# Patient Record
Sex: MALE | Race: WHITE | NOT HISPANIC OR LATINO | Employment: OTHER | URBAN - METROPOLITAN AREA
[De-identification: names, ages, dates, MRNs, and addresses within clinical notes are randomized per-mention and may not be internally consistent; named-entity substitution may affect disease eponyms.]

---

## 2023-12-06 ENCOUNTER — OFFICE VISIT (OUTPATIENT)
Dept: FAMILY MEDICINE | Facility: CLINIC | Age: 63
End: 2023-12-06
Payer: MEDICAID

## 2023-12-06 ENCOUNTER — HOSPITAL ENCOUNTER (EMERGENCY)
Facility: HOSPITAL | Age: 63
Discharge: HOME | End: 2023-12-06
Attending: EMERGENCY MEDICINE
Payer: MEDICAID

## 2023-12-06 VITALS
TEMPERATURE: 98 F | SYSTOLIC BLOOD PRESSURE: 196 MMHG | RESPIRATION RATE: 20 BRPM | HEIGHT: 69 IN | BODY MASS INDEX: 34.48 KG/M2 | OXYGEN SATURATION: 97 % | WEIGHT: 232.81 LBS | DIASTOLIC BLOOD PRESSURE: 123 MMHG | HEART RATE: 76 BPM

## 2023-12-06 VITALS
OXYGEN SATURATION: 96 % | HEART RATE: 67 BPM | WEIGHT: 228 LBS | BODY MASS INDEX: 33.77 KG/M2 | DIASTOLIC BLOOD PRESSURE: 86 MMHG | TEMPERATURE: 98 F | HEIGHT: 69 IN | RESPIRATION RATE: 20 BRPM | SYSTOLIC BLOOD PRESSURE: 145 MMHG

## 2023-12-06 DIAGNOSIS — I10 PRIMARY HYPERTENSION: Primary | ICD-10-CM

## 2023-12-06 DIAGNOSIS — H91.93 BILATERAL CHANGE IN HEARING: ICD-10-CM

## 2023-12-06 DIAGNOSIS — E78.5 HYPERLIPIDEMIA, UNSPECIFIED HYPERLIPIDEMIA TYPE: ICD-10-CM

## 2023-12-06 DIAGNOSIS — Z00.00 ENCOUNTER FOR WELLNESS EXAMINATION: Primary | ICD-10-CM

## 2023-12-06 DIAGNOSIS — I10 HYPERTENSION, UNSPECIFIED TYPE: ICD-10-CM

## 2023-12-06 DIAGNOSIS — Z12.5 ENCOUNTER FOR SCREENING FOR MALIGNANT NEOPLASM OF PROSTATE: ICD-10-CM

## 2023-12-06 DIAGNOSIS — H93.12 TINNITUS OF LEFT EAR: ICD-10-CM

## 2023-12-06 DIAGNOSIS — J32.4 CHRONIC PANSINUSITIS: ICD-10-CM

## 2023-12-06 LAB
ALBUMIN SERPL-MCNC: 3.8 G/DL (ref 3.4–4.8)
ALBUMIN/GLOB SERPL: 1.1 RATIO (ref 1.1–2)
ALP SERPL-CCNC: 90 UNIT/L (ref 40–150)
ALT SERPL-CCNC: 13 UNIT/L (ref 0–55)
AMPHET UR QL SCN: NEGATIVE
ANISOCYTOSIS BLD QL SMEAR: ABNORMAL
APPEARANCE UR: CLEAR
AST SERPL-CCNC: 16 UNIT/L (ref 5–34)
BARBITURATE SCN PRESENT UR: NEGATIVE
BASOPHILS # BLD AUTO: 0.08 X10(3)/MCL
BASOPHILS NFR BLD AUTO: 1 %
BENZODIAZ UR QL SCN: NEGATIVE
BILIRUB SERPL-MCNC: 0.5 MG/DL
BILIRUB UR QL STRIP.AUTO: NEGATIVE
BUN SERPL-MCNC: 6.1 MG/DL (ref 8.4–25.7)
CALCIUM SERPL-MCNC: 9.3 MG/DL (ref 8.8–10)
CANNABINOIDS UR QL SCN: NEGATIVE
CHLORIDE SERPL-SCNC: 104 MMOL/L (ref 98–107)
CHOLEST SERPL-MCNC: 212 MG/DL
CHOLEST/HDLC SERPL: 5 {RATIO} (ref 0–5)
CO2 SERPL-SCNC: 29 MMOL/L (ref 23–31)
COCAINE UR QL SCN: NEGATIVE
COLOR UR AUTO: NORMAL
CREAT SERPL-MCNC: 0.78 MG/DL (ref 0.73–1.18)
EOSINOPHIL # BLD AUTO: 0.39 X10(3)/MCL (ref 0–0.9)
EOSINOPHIL NFR BLD AUTO: 4.9 %
ERYTHROCYTE [DISTWIDTH] IN BLOOD BY AUTOMATED COUNT: 21.3 % (ref 11.5–17)
ETHANOL SERPL-MCNC: <10 MG/DL
FENTANYL UR QL SCN: NEGATIVE
GFR SERPLBLD CREATININE-BSD FMLA CKD-EPI: >60 MLS/MIN/1.73/M2
GLOBULIN SER-MCNC: 3.4 GM/DL (ref 2.4–3.5)
GLUCOSE SERPL-MCNC: 107 MG/DL (ref 82–115)
GLUCOSE UR QL STRIP.AUTO: NEGATIVE
HCT VFR BLD AUTO: 37.4 % (ref 42–52)
HDLC SERPL-MCNC: 44 MG/DL (ref 35–60)
HGB BLD-MCNC: 10.6 G/DL (ref 14–18)
HYPOCHROMIA BLD QL SMEAR: ABNORMAL
IMM GRANULOCYTES # BLD AUTO: 0.03 X10(3)/MCL (ref 0–0.04)
IMM GRANULOCYTES NFR BLD AUTO: 0.4 %
KETONES UR QL STRIP.AUTO: NEGATIVE
LDLC SERPL CALC-MCNC: 147 MG/DL (ref 50–140)
LEUKOCYTE ESTERASE UR QL STRIP.AUTO: NEGATIVE
LYMPHOCYTES # BLD AUTO: 1.46 X10(3)/MCL (ref 0.6–4.6)
LYMPHOCYTES NFR BLD AUTO: 18.2 %
MAGNESIUM SERPL-MCNC: 1.8 MG/DL (ref 1.6–2.6)
MCH RBC QN AUTO: 20.5 PG (ref 27–31)
MCHC RBC AUTO-ENTMCNC: 28.3 G/DL (ref 33–36)
MCV RBC AUTO: 72.5 FL (ref 80–94)
MDMA UR QL SCN: NEGATIVE
MICROCYTES BLD QL SMEAR: ABNORMAL
MONOCYTES # BLD AUTO: 0.63 X10(3)/MCL (ref 0.1–1.3)
MONOCYTES NFR BLD AUTO: 7.8 %
NEUTROPHILS # BLD AUTO: 5.45 X10(3)/MCL (ref 2.1–9.2)
NEUTROPHILS NFR BLD AUTO: 67.7 %
NITRITE UR QL STRIP.AUTO: NEGATIVE
NRBC BLD AUTO-RTO: 0 %
OPIATES UR QL SCN: NEGATIVE
PCP UR QL: NEGATIVE
PH UR STRIP.AUTO: 8 [PH]
PH UR: 8 [PH] (ref 3–11)
PLATELET # BLD AUTO: 418 X10(3)/MCL (ref 130–400)
PLATELET # BLD EST: ABNORMAL 10*3/UL
PMV BLD AUTO: 9.6 FL (ref 7.4–10.4)
POTASSIUM SERPL-SCNC: 4.3 MMOL/L (ref 3.5–5.1)
PROT SERPL-MCNC: 7.2 GM/DL (ref 5.8–7.6)
PROT UR QL STRIP.AUTO: NEGATIVE
RBC # BLD AUTO: 5.16 X10(6)/MCL (ref 4.7–6.1)
RBC MORPH BLD: ABNORMAL
RBC UR QL AUTO: NEGATIVE
SODIUM SERPL-SCNC: 142 MMOL/L (ref 136–145)
SP GR UR STRIP.AUTO: 1.01 (ref 1–1.03)
TRIGL SERPL-MCNC: 105 MG/DL (ref 34–140)
TROPONIN I SERPL-MCNC: 0.02 NG/ML (ref 0–0.04)
TSH SERPL-ACNC: 2.24 UIU/ML (ref 0.35–4.94)
UROBILINOGEN UR STRIP-ACNC: 0.2
VLDLC SERPL CALC-MCNC: 21 MG/DL
WBC # SPEC AUTO: 8.04 X10(3)/MCL (ref 4.5–11.5)

## 2023-12-06 PROCEDURE — 83735 ASSAY OF MAGNESIUM: CPT | Performed by: EMERGENCY MEDICINE

## 2023-12-06 PROCEDURE — 99285 EMERGENCY DEPT VISIT HI MDM: CPT | Mod: 25,27

## 2023-12-06 PROCEDURE — 84443 ASSAY THYROID STIM HORMONE: CPT | Performed by: EMERGENCY MEDICINE

## 2023-12-06 PROCEDURE — 3008F PR BODY MASS INDEX (BMI) DOCUMENTED: ICD-10-PCS | Mod: CPTII,,,

## 2023-12-06 PROCEDURE — 3077F PR MOST RECENT SYSTOLIC BLOOD PRESSURE >= 140 MM HG: ICD-10-PCS | Mod: CPTII,,,

## 2023-12-06 PROCEDURE — 96374 THER/PROPH/DIAG INJ IV PUSH: CPT

## 2023-12-06 PROCEDURE — 3080F DIAST BP >= 90 MM HG: CPT | Mod: CPTII,,,

## 2023-12-06 PROCEDURE — 99204 OFFICE O/P NEW MOD 45 MIN: CPT | Mod: PBBFAC,PN,25

## 2023-12-06 PROCEDURE — 99386 PR PREVENTIVE VISIT,NEW,40-64: ICD-10-PCS | Mod: S$PBB,,,

## 2023-12-06 PROCEDURE — 93010 ELECTROCARDIOGRAM REPORT: CPT | Mod: ,,, | Performed by: STUDENT IN AN ORGANIZED HEALTH CARE EDUCATION/TRAINING PROGRAM

## 2023-12-06 PROCEDURE — 82077 ASSAY SPEC XCP UR&BREATH IA: CPT | Performed by: EMERGENCY MEDICINE

## 2023-12-06 PROCEDURE — 84484 ASSAY OF TROPONIN QUANT: CPT | Performed by: EMERGENCY MEDICINE

## 2023-12-06 PROCEDURE — 1160F RVW MEDS BY RX/DR IN RCRD: CPT | Mod: CPTII,,,

## 2023-12-06 PROCEDURE — 93010 EKG 12-LEAD: ICD-10-PCS | Mod: ,,, | Performed by: STUDENT IN AN ORGANIZED HEALTH CARE EDUCATION/TRAINING PROGRAM

## 2023-12-06 PROCEDURE — 80061 LIPID PANEL: CPT | Performed by: EMERGENCY MEDICINE

## 2023-12-06 PROCEDURE — 85025 COMPLETE CBC W/AUTO DIFF WBC: CPT | Performed by: EMERGENCY MEDICINE

## 2023-12-06 PROCEDURE — 1159F PR MEDICATION LIST DOCUMENTED IN MEDICAL RECORD: ICD-10-PCS | Mod: CPTII,,,

## 2023-12-06 PROCEDURE — 99386 PREV VISIT NEW AGE 40-64: CPT | Mod: S$PBB,,,

## 2023-12-06 PROCEDURE — 93005 ELECTROCARDIOGRAM TRACING: CPT

## 2023-12-06 PROCEDURE — 25000003 PHARM REV CODE 250: Performed by: EMERGENCY MEDICINE

## 2023-12-06 PROCEDURE — 3008F BODY MASS INDEX DOCD: CPT | Mod: CPTII,,,

## 2023-12-06 PROCEDURE — 80053 COMPREHEN METABOLIC PANEL: CPT | Performed by: EMERGENCY MEDICINE

## 2023-12-06 PROCEDURE — 80307 DRUG TEST PRSMV CHEM ANLYZR: CPT | Performed by: EMERGENCY MEDICINE

## 2023-12-06 PROCEDURE — 63600175 PHARM REV CODE 636 W HCPCS: Performed by: EMERGENCY MEDICINE

## 2023-12-06 PROCEDURE — 81003 URINALYSIS AUTO W/O SCOPE: CPT | Mod: 59 | Performed by: EMERGENCY MEDICINE

## 2023-12-06 PROCEDURE — 3080F PR MOST RECENT DIASTOLIC BLOOD PRESSURE >= 90 MM HG: ICD-10-PCS | Mod: CPTII,,,

## 2023-12-06 PROCEDURE — 3077F SYST BP >= 140 MM HG: CPT | Mod: CPTII,,,

## 2023-12-06 PROCEDURE — 1160F PR REVIEW ALL MEDS BY PRESCRIBER/CLIN PHARMACIST DOCUMENTED: ICD-10-PCS | Mod: CPTII,,,

## 2023-12-06 PROCEDURE — 1159F MED LIST DOCD IN RCRD: CPT | Mod: CPTII,,,

## 2023-12-06 RX ORDER — AMLODIPINE BESYLATE 10 MG/1
10 TABLET ORAL DAILY
Qty: 30 TABLET | Refills: 2 | Status: SHIPPED | OUTPATIENT
Start: 2023-12-06 | End: 2023-12-13

## 2023-12-06 RX ORDER — DOXYCYCLINE 100 MG/1
100 CAPSULE ORAL 2 TIMES DAILY
Qty: 20 CAPSULE | Refills: 0 | Status: SHIPPED | OUTPATIENT
Start: 2023-12-06 | End: 2023-12-16

## 2023-12-06 RX ORDER — AMLODIPINE BESYLATE 5 MG/1
10 TABLET ORAL
Status: COMPLETED | OUTPATIENT
Start: 2023-12-06 | End: 2023-12-06

## 2023-12-06 RX ORDER — HYDRALAZINE HYDROCHLORIDE 20 MG/ML
10 INJECTION INTRAMUSCULAR; INTRAVENOUS
Status: COMPLETED | OUTPATIENT
Start: 2023-12-06 | End: 2023-12-06

## 2023-12-06 RX ORDER — CLONIDINE HYDROCHLORIDE 0.1 MG/1
0.2 TABLET ORAL
Status: COMPLETED | OUTPATIENT
Start: 2023-12-06 | End: 2023-12-06

## 2023-12-06 RX ORDER — BENAZEPRIL HYDROCHLORIDE 40 MG/1
40 TABLET ORAL DAILY
Qty: 30 TABLET | Refills: 2 | Status: SHIPPED | OUTPATIENT
Start: 2023-12-06 | End: 2023-12-13 | Stop reason: SDUPTHER

## 2023-12-06 RX ORDER — AZELASTINE HYDROCHLORIDE, FLUTICASONE PROPIONATE 137; 50 UG/1; UG/1
1 SPRAY, METERED NASAL 2 TIMES DAILY
Qty: 1 EACH | Refills: 1 | Status: SHIPPED | OUTPATIENT
Start: 2023-12-06 | End: 2024-01-16

## 2023-12-06 RX ADMIN — AMLODIPINE BESYLATE 10 MG: 5 TABLET ORAL at 11:12

## 2023-12-06 RX ADMIN — CLONIDINE HYDROCHLORIDE 0.2 MG: 0.1 TABLET ORAL at 09:12

## 2023-12-06 RX ADMIN — HYDRALAZINE HYDROCHLORIDE 10 MG: 20 INJECTION INTRAMUSCULAR; INTRAVENOUS at 09:12

## 2023-12-06 NOTE — ASSESSMENT & PLAN NOTE
Patient directed to report to ED for control of HTN and return next week once stabilized. Discussed unable to monitor in primary care clinic, patient amenable with this plan. Transportation offered and refused.

## 2023-12-06 NOTE — ED TRIAGE NOTES
Hypertension Pt to er from [facility] urgent care with c/o high blood pressure readings. Denies s/s.

## 2023-12-06 NOTE — ED PROVIDER NOTES
Encounter Date: 12/6/2023       History     Chief Complaint   Patient presents with    Hypertension     Pt to er from [facility] urgent care with c/o high blood pressure readings. Denies s/s.     63-year-old male with a history of hypertension, hyperlipidemia, chronic sinusitis, presents to the emergency room due to severely elevated blood pressure.  He only complains of severe sinus congestion but has no fever, headache, chest pain, shortness of breath, or other symptoms.  He went to his PCP this morning but was referred to the emergency room due to his severely elevated blood pressure. /148.  He was on amlodipine 10 mg and benazepril 40 mg daily but stopped this on his own 3 to 4 years ago.  He also was on cholesterol medicine which he discontinued.  The sinus congestion has been present for 4-6 months.  He had sinus surgery and also throat surgery due to snoring many years ago.  He does have a history of alcoholism but quit drinking 2 weeks ago.        Review of patient's allergies indicates:   Allergen Reactions    Penicillins Other (See Comments)     No past medical history on file.  No past surgical history on file.  No family history on file.  Social History     Tobacco Use    Smoking status: Never    Smokeless tobacco: Never   Substance Use Topics    Alcohol use: Yes     Comment: occ    Drug use: Never     Review of Systems   HENT:  Positive for rhinorrhea, sinus pressure and sinus pain.    All other systems reviewed and are negative.      Physical Exam     Initial Vitals [12/06/23 0908]   BP Pulse Resp Temp SpO2   (!) 222/148 76 20 97.9 °F (36.6 °C) 98 %      MAP       --         Physical Exam    Nursing note and vitals reviewed.  Constitutional: Vital signs are normal. He appears well-developed and well-nourished.   HENT:   Head: Normocephalic and atraumatic.   Mouth/Throat: Oropharynx is clear and moist.   TMs are normal, no mastoid erythema or tenderness   Eyes: Pupils are equal, round, and reactive to  light.   Neck: Neck supple. No JVD present.   Cardiovascular:  Normal rate, regular rhythm and normal heart sounds.           Pulmonary/Chest: Breath sounds normal. No respiratory distress.   Abdominal: Abdomen is soft. There is no abdominal tenderness.   Musculoskeletal:         General: No edema.      Cervical back: Neck supple. No edema or erythema.     Lymphadenopathy:     He has no cervical adenopathy.   Neurological: He is alert and oriented to person, place, and time. No cranial nerve deficit. GCS score is 15. GCS eye subscore is 4. GCS verbal subscore is 5. GCS motor subscore is 6.   Skin: Skin is warm and dry. Capillary refill takes less than 2 seconds.         ED Course   Procedures  Labs Reviewed   CBC WITH DIFFERENTIAL - Abnormal; Notable for the following components:       Result Value    Hgb 10.6 (*)     Hct 37.4 (*)     MCV 72.5 (*)     MCH 20.5 (*)     MCHC 28.3 (*)     RDW 21.3 (*)     Platelet 418 (*)     All other components within normal limits   COMPREHENSIVE METABOLIC PANEL - Abnormal; Notable for the following components:    Blood Urea Nitrogen 6.1 (*)     All other components within normal limits   LIPID PANEL - Abnormal; Notable for the following components:    Cholesterol Total 212 (*)     LDL Cholesterol 147.00 (*)     All other components within normal limits   BLOOD SMEAR MICROSCOPIC EXAM (OLG) - Abnormal; Notable for the following components:    RBC Morph Abnormal (*)     Anisocytosis 2+ (*)     Hypochromasia 3+ (*)     Microcytosis 2+ (*)     Platelets Increased (*)     All other components within normal limits   URINALYSIS, REFLEX TO URINE CULTURE - Normal   TROPONIN I - Normal   ALCOHOL,MEDICAL (ETHANOL) - Normal   DRUG SCREEN, URINE (BEAKER) - Normal    Narrative:     Cut off concentrations:    Amphetamines - 1000 ng/ml  Barbiturates - 200 ng/ml  Benzodiazepine - 200 ng/ml  Cannabinoids (THC) - 50 ng/ml  Cocaine - 300 ng/ml  Fentanyl - 1.0 ng/ml  MDMA - 500 ng/ml  Opiates - 300  ng/ml   Phencyclidine (PCP) - 25 ng/ml    Specimen submitted for drug analysis and tested for pH and specific gravity in order to evaluate sample integrity. Suspect tampering if specific gravity is <1.003 and/or pH is not within the range of 4.5 - 8.0  False negatives may result form substances such as bleach added to urine.  False positives may result for the presence of a substance with similar chemical structure to the drug or its metabolite.    This test provides only a PRELIMINARY analytical test result. A more specific alternate chemical method must be used in order to obtain a confirmed analytical result. Gas chromatography/mass spectrometry (GC/MS) is the preferred confirmatory method. Other chemical confirmation methods are available. Clinical consideration and professional judgement should be applied to any drug of abuse test result, particularly when preliminary positive results are used.    Positive results will be confirmed only at the physicians request. Unconfirmed screening results are to be used only for medical purposes (treatment).        TSH - Normal   MAGNESIUM - Normal     EKG Readings: (Independently Interpreted)   Initial Reading: No STEMI. Rhythm: Normal Sinus Rhythm. Heart Rate: 72. Ectopy: PACs. ST Segments: Normal ST Segments. T Waves: Normal. Clinical Impression: Normal Sinus Rhythm       Imaging Results              CT Sinuses without Contrast (Final result)  Result time 12/06/23 11:32:53      Final result by Sid Gibson MD (12/06/23 11:32:53)                   Impression:      1. Severe sinusitis changes.    2. Left mastoiditis changes.      Electronically signed by: Sid Gibson  Date:    12/06/2023  Time:    11:32               Narrative:    EXAMINATION:  CT SINUSES WITHOUT CONTRAST    CLINICAL HISTORY:  Sinusitis, chronic or recurrent;    TECHNIQUE:  Sequential images were performed of the paranasal sinuses without contrast administration.    Dose length product was 1007 mGycm.  Automated exposure control was utilized to minimize radiation dose.    COMPARISON:  None available    FINDINGS:  There is complete loss of pneumatization of the left maxillary sinus occupied by mucoperiosteal thickening which contiguously extends across the expanded ostiomeatal unit.  There is also complete loss of pneumatization of the left ethmoidal air cell and the left frontal sinus again occupied by mucoperiosteal thickening.  Associated sinonasal polyposis is without exclusion.   There is also pronounced mucoperiosteal thickening of the small volume right frontal and the right ethmoid air cells.  Moderate mucoperiosteal thickening also involveSthe right maxillary sinus with obliteration of the ostiomeatal unit.  There are also mild chronic sinusitis changes involving the sphenoid sinus.    There are no orbital subperiosteal inflammations.    Loss of pneumatization of the left mastoid air cells with opacification and fluid.                                       CT Head Without Contrast (Final result)  Result time 12/06/23 11:36:38      Final result by Sid Gibson MD (12/06/23 11:36:38)                   Impression:      No acute intracranial findings identified.      Electronically signed by: Sid Gibson  Date:    12/06/2023  Time:    11:36               Narrative:    EXAMINATION:  CT HEAD WITHOUT CONTRAST    CLINICAL HISTORY:  Headache, chronic, new features or increased frequency;    TECHNIQUE:  Sequential axial images were performed of the brain without contrast.    Dose product length of 1007 mGycm. Automated exposure control was utilized to minimize radiation dose.    COMPARISON:  None available.    FINDINGS:  There is no intracranial mass effect, midline shift, hydrocephalus or hemorrhage. There is no sulcal effacement or low attenuation changes to suggest recent large vessel territory infarction. Chronic appearing periventricular and subcortical white matter low attenuation changes are present and  are consistent with mild chronic microangiopathic ischemia. The ventricular system and sulcal markings prominence is consistent with atrophy. There is no acute extra axial fluid collection.    Sinuses findings are described on separate report.                                       Medications   cloNIDine tablet 0.2 mg (0.2 mg Oral Given 12/6/23 0953)   hydrALAZINE injection 10 mg (10 mg Intravenous Given 12/6/23 0953)   amLODIPine tablet 10 mg (10 mg Oral Given 12/6/23 1144)     Medical Decision Making  63-year-old male with a history of hypertension, hyperlipidemia, chronic sinusitis, presents to the emergency room due to severely elevated blood pressure.  He only complains of severe sinus congestion but has no fever, headache, chest pain, shortness of breath, or other symptoms.  He went to his PCP this morning but was referred to the emergency room due to his severely elevated blood pressure. /148.  He was on amlodipine 10 mg and benazepril 40 mg daily but stopped this on his own 3 to 4 years ago.  He also was on cholesterol medicine which he discontinued.  The sinus congestion has been present for 4-6 months.  He had sinus surgery and also throat surgery due to snoring many years ago.  He does have a history of alcoholism but quit drinking 2 weeks ago.      Differential diagnosis includes but is not limited to hypertensive urgency, hypertensive emergency, alcoholism, sinusitis, mastoiditis, otitis media    Amount and/or Complexity of Data Reviewed  Labs: ordered.  Radiology: ordered.  Discussion of management or test interpretation with external provider(s): Patient was seen and evaluated in the emergency department and treated for his hypertension in his blood pressure responded well.  He remained asymptomatic other than his sinus congestion.  CT scan is very concerning for his pan sinusitis and I will refer him to ENT at [facility] considering he is had sinus surgery in the past.  I will treat him with  antibiotics and start him his blood pressure medication.  Should be seeing his PCP next week so I will defer to the PCP regarding treatment of the hyperlipidemia.  Patient has had no headache or chest pain and no shortness of breath.  He is stable for discharge home with ER return precautions discussed.    Risk  Prescription drug management.                                      Clinical Impression:  Final diagnoses:  [I10] Primary hypertension (Primary)  [J32.4] Chronic pansinusitis  [E78.5] Hyperlipidemia, unspecified hyperlipidemia type          ED Disposition Condition    Discharge Stable          ED Prescriptions       Medication Sig Dispense Start Date End Date Auth. Provider    amLODIPine (NORVASC) 10 MG tablet Take 1 tablet (10 mg total) by mouth once daily. 30 tablet 12/6/2023 12/5/2024 Adrianna Diego MD    benazepriL (LOTENSIN) 40 MG tablet Take 1 tablet (40 mg total) by mouth once daily. 30 tablet 12/6/2023 -- Adrianna Diego MD    azelastine-fluticasone (DYMISTA) 137-50 mcg/spray Spry nassal spray 1 spray by Each Nostril route 2 (two) times daily. 1 each 12/6/2023 -- Adrianna Diego MD    doxycycline (VIBRAMYCIN) 100 MG Cap Take 1 capsule (100 mg total) by mouth 2 (two) times daily. for 10 days 20 capsule 12/6/2023 12/16/2023 Adrianna Diego MD          Follow-up Information       Follow up With Specialties Details Why Contact Info    Rayna Preston NP Family Medicine   [address] [place] [id number]  [phone]               Adrianna Diego MD  12/08/23 0612

## 2023-12-06 NOTE — PROGRESS NOTES
"Patient Name: Rodriguez Dwyer     : 1960    MRN: [id number]     Subjective:     Patient ID: Rodriguez Dwyer is a 63 y.o. male.    Chief Complaint:   Chief Complaint   Patient presents with    Establish Care     New patient. C/o sinus issues. States went to ED, xrays were taken.         HPI: 2023: Patient presents to clinic today to establish care, patient has no chronic medical conditions which they have ever been managed.  Patient does not take any daily medications that they need refilled. Patient denies chest pain, palpitations, and shortness of breath. He is extremely hypertensive in clinic today and states he went to ED about a month ago for head pressure but was told his ears were "infected". Ears clear today.  Endorses tinnitus in his left ear as well as bilateral hearing loss.  Thought this was all because of an infection in his ears.  Endorses "constant head pressure" since this ED visit.  Patient denies fever, night sweats, chills, nausea, vomiting, diarrhea, constipation, weight loss, and changes in appetite.  Wellness labs (CBC, CMP, A1c, FLP, TSH, Free T4) ordered for patient today, with added PSA for prostate cancer screening screening.        ROS:       12 point review of systems conducted, negative except as stated in the history of present illness. See HPI for details.    History:     History reviewed. No pertinent past medical history.     History reviewed. No pertinent surgical history.    History reviewed. No pertinent family history.     Social History     Tobacco Use    Smoking status: Never    Smokeless tobacco: Never   Substance and Sexual Activity    Alcohol use: Yes     Comment: occ    Drug use: Never    Sexual activity: Not on file       No current outpatient medications     Review of patient's allergies indicates:   Allergen Reactions    Penicillins Other (See Comments)       Objective:     Visit Vitals  BP (!) 196/123 (BP Location: Left arm, Patient Position: Sitting)   Pulse 76 " "  Temp 98 °F (36.7 °C) (Oral)   Resp 20   Ht 5' 9" (1.753 m)   Wt 105.6 kg (232 lb 12.8 oz)   SpO2 97%   BMI 34.38 kg/m²       Physical Examination:     Physical Exam  Constitutional:       General: He is not in acute distress.     Appearance: Normal appearance. He is not ill-appearing.   Cardiovascular:      Rate and Rhythm: Normal rate and regular rhythm.      Heart sounds: Normal heart sounds.   Pulmonary:      Effort: Pulmonary effort is normal. No respiratory distress.      Breath sounds: Normal breath sounds.   Musculoskeletal:      Cervical back: Normal range of motion.   Skin:     General: Skin is warm and dry.   Neurological:      Mental Status: He is alert and oriented to person, place, and time.   Psychiatric:         Mood and Affect: Mood normal.         Behavior: Behavior normal.         Lab Results:     Chemistry:  No results found for: "NA", "K", "CHLORIDE", "BUN", "CREATININE", "EGFRNORACEVR", "GLUCOSE", "CALCIUM", "ALKPHOS", "LABPROT", "ALBUMIN", "BILIDIR", "IBILI", "AST", "ALT", "MG", "PHOS", "[id number]", "TSH", "[id number]", "PSA"     No results found for: "HGBA1C", "MICROALBCREA"     Hematology:  No results found for: "WBC", "HGB", "HCT", "PLT"    Lipid Panel:  No results found for: "CHOL", "HDL", "LDL", "TRIG", "TOTALCHOLEST"     Urine:  No results found for: "COLORUA", "APPEARANCEUA", "SGUA", "PHUA", "PROTEINUA", "GLUCOSEUA", "KETONESUA", "BLOODUA", "NITRITESUA", "LEUKOCYTESUR", "RBCUA", "WBCUA", "BACTERIA", "SQEPUA", "HYALINECASTS", "CREATRANDUR", "PROTEINURINE", "UPROTCREA"     Assessment:          ICD-10-CM ICD-9-CM   1. Encounter for wellness examination  Z00.00 V70.0   2. Encounter for screening for malignant neoplasm of prostate  Z12.5 V76.44   3. Hypertension, unspecified type  I10 401.9   4. Tinnitus of left ear  H93.12 388.30   5. Bilateral change in hearing  H91.93 389.9        Plan:     1. Encounter for wellness examination  -     Cancel: TSH  -     Cancel: T4, Free  -     " Cancel: Hemoglobin A1C  -     Cancel: SYPHILIS ANTIBODY (WITH REFLEX RPR)  -     Cancel: Hepatitis Panel, Acute  -     Cancel: Lipid Panel  -     Cancel: CBC Auto Differential  -     Cancel: Comprehensive Metabolic Panel  -     Cancel: HIV 1/2 Ag/Ab (4th Gen)  -     Cancel: Vitamin D  -     Chlamydia/GC, PCR  -     Cancel: Urinalysis, Reflex to Urine Culture  -     TSH; Future; Expected date: 12/06/2023  -     T4, Free; Future; Expected date: 12/06/2023  -     Hemoglobin A1C; Future; Expected date: 12/06/2023  -     SYPHILIS ANTIBODY (WITH REFLEX RPR); Future; Expected date: 12/06/2023  -     Hepatitis Panel, Acute; Future; Expected date: 12/06/2023  -     Lipid Panel; Future; Expected date: 12/06/2023  -     CBC Auto Differential; Future; Expected date: 12/06/2023  -     Comprehensive Metabolic Panel; Future; Expected date: 12/06/2023  -     HIV 1/2 Ag/Ab (4th Gen); Future; Expected date: 12/06/2023  -     Vitamin D; Future; Expected date: 12/06/2023  -     Urinalysis, Reflex to Urine Culture; Future; Expected date: 12/06/2023    2. Encounter for screening for malignant neoplasm of prostate  -     Cancel: PSA, Screening  -     PSA, Screening; Future; Expected date: 12/06/2023    3. Hypertension, unspecified type  Overview:  Low Sodium Diet (Dash Diet - less than 2 grams of sodium per day).  Monitor Blood Pressure daily and log. Report any consistent numbers greater than 140/90.  Smoking Cessation encouraged to aid in BP reduction.  Maintain healthy weight with goal BMI <30. Exercise 30 minutes per day 5 days per week        Assessment & Plan:  Patient directed to report to ED for control of HTN and return next week once stabilized. Discussed unable to monitor in primary care clinic, patient amenable with this plan. Transportation offered and refused.          4. Tinnitus of left ear  -     Ambulatory referral/consult to Audiology; Future; Expected date: 12/06/2023    5. Bilateral change in hearing  -     Ambulatory  referral/consult to Audiology; Future; Expected date: 12/06/2023       Patient may come to clinic early next week to complete labs prior to his appointment on 12/13 should he want these labs available for review at this appointment.    Future Appointments   Date Time Provider Department Center   12/13/2023  7:00 AM Rayna Preston NP [facility]      RED FLAGS/WARNING SYMPTOMS DISCUSSED WITH PATIENT THAT WOULD WARRANT EMERGENT MEDICAL ATTENTION. PATIENT VERBALIZED UNDERSTANDING.     Rayna Preston NP

## 2023-12-06 NOTE — DISCHARGE INSTRUCTIONS
Take your medication as prescribed.  Referral to ear nose and throat at [facility] was placed.  Follow-up with your doctor on Wednesday as scheduled. You will also need a cholesterol medicine but I will not start that today.  Return to the emergency room for any severe symptoms like severe headache, chest pain, passing out, or other symptoms.

## 2023-12-13 ENCOUNTER — OFFICE VISIT (OUTPATIENT)
Dept: FAMILY MEDICINE | Facility: CLINIC | Age: 63
End: 2023-12-13
Payer: MEDICAID

## 2023-12-13 ENCOUNTER — HOSPITAL ENCOUNTER (OUTPATIENT)
Dept: RADIOLOGY | Facility: HOSPITAL | Age: 63
Discharge: HOME | End: 2023-12-13
Payer: MEDICAID

## 2023-12-13 VITALS
DIASTOLIC BLOOD PRESSURE: 72 MMHG | BODY MASS INDEX: 33.45 KG/M2 | TEMPERATURE: 98 F | RESPIRATION RATE: 20 BRPM | HEIGHT: 69 IN | HEART RATE: 79 BPM | SYSTOLIC BLOOD PRESSURE: 145 MMHG | OXYGEN SATURATION: 97 % | WEIGHT: 225.81 LBS

## 2023-12-13 DIAGNOSIS — Z00.00 ENCOUNTER FOR WELLNESS EXAMINATION: ICD-10-CM

## 2023-12-13 DIAGNOSIS — Z12.5 ENCOUNTER FOR SCREENING FOR MALIGNANT NEOPLASM OF PROSTATE: ICD-10-CM

## 2023-12-13 DIAGNOSIS — M25.532 LEFT WRIST PAIN: ICD-10-CM

## 2023-12-13 DIAGNOSIS — E78.49 OTHER HYPERLIPIDEMIA: ICD-10-CM

## 2023-12-13 DIAGNOSIS — I10 HYPERTENSION, UNSPECIFIED TYPE: Primary | ICD-10-CM

## 2023-12-13 DIAGNOSIS — R09.81 SINUS CONGESTION: ICD-10-CM

## 2023-12-13 LAB
BASOPHILS # BLD AUTO: 0.12 X10(3)/MCL
BASOPHILS NFR BLD AUTO: 1.3 %
EOSINOPHIL # BLD AUTO: 0.46 X10(3)/MCL (ref 0–0.9)
EOSINOPHIL NFR BLD AUTO: 5.1 %
ERYTHROCYTE [DISTWIDTH] IN BLOOD BY AUTOMATED COUNT: 21.5 % (ref 11.5–17)
EST. AVERAGE GLUCOSE BLD GHB EST-MCNC: 114 MG/DL
HAV IGM SERPL QL IA: NONREACTIVE
HBA1C MFR BLD: 5.6 %
HBV CORE IGM SERPL QL IA: NONREACTIVE
HBV SURFACE AG SERPL QL IA: NONREACTIVE
HCT VFR BLD AUTO: 39.3 % (ref 42–52)
HCV AB SERPL QL IA: NONREACTIVE
HGB BLD-MCNC: 10.8 G/DL (ref 14–18)
HIV 1+2 AB+HIV1 P24 AG SERPL QL IA: NONREACTIVE
HYPOCHROMIA BLD QL SMEAR: ABNORMAL
IMM GRANULOCYTES # BLD AUTO: 0.02 X10(3)/MCL (ref 0–0.04)
IMM GRANULOCYTES NFR BLD AUTO: 0.2 %
LYMPHOCYTES # BLD AUTO: 1.72 X10(3)/MCL (ref 0.6–4.6)
LYMPHOCYTES NFR BLD AUTO: 19 %
MCH RBC QN AUTO: 20.4 PG (ref 27–31)
MCHC RBC AUTO-ENTMCNC: 27.5 G/DL (ref 33–36)
MCV RBC AUTO: 74.2 FL (ref 80–94)
MICROCYTES BLD QL SMEAR: ABNORMAL
MONOCYTES # BLD AUTO: 0.7 X10(3)/MCL (ref 0.1–1.3)
MONOCYTES NFR BLD AUTO: 7.7 %
NEUTROPHILS # BLD AUTO: 6.02 X10(3)/MCL (ref 2.1–9.2)
NEUTROPHILS NFR BLD AUTO: 66.7 %
NRBC BLD AUTO-RTO: 0 %
PLATELET # BLD AUTO: 435 X10(3)/MCL (ref 130–400)
PLATELET # BLD EST: ABNORMAL 10*3/UL
PMV BLD AUTO: 9.6 FL (ref 7.4–10.4)
PSA SERPL-MCNC: 2.74 NG/ML
RBC # BLD AUTO: 5.3 X10(6)/MCL (ref 4.7–6.1)
RBC MORPH BLD: ABNORMAL
SPHEROCYTES BLD QL SMEAR: ABNORMAL
T PALLIDUM AB SER QL: NONREACTIVE
WBC # SPEC AUTO: 9.04 X10(3)/MCL (ref 4.5–11.5)

## 2023-12-13 PROCEDURE — 85025 COMPLETE CBC W/AUTO DIFF WBC: CPT

## 2023-12-13 PROCEDURE — 99214 OFFICE O/P EST MOD 30 MIN: CPT | Mod: S$PBB,,,

## 2023-12-13 PROCEDURE — 1159F MED LIST DOCD IN RCRD: CPT | Mod: CPTII,,,

## 2023-12-13 PROCEDURE — 4010F ACE/ARB THERAPY RXD/TAKEN: CPT | Mod: CPTII,,,

## 2023-12-13 PROCEDURE — 3008F BODY MASS INDEX DOCD: CPT | Mod: CPTII,,,

## 2023-12-13 PROCEDURE — 99215 OFFICE O/P EST HI 40 MIN: CPT | Mod: PBBFAC,PN

## 2023-12-13 PROCEDURE — 4010F PR ACE/ARB THEARPY RXD/TAKEN: ICD-10-PCS | Mod: CPTII,,,

## 2023-12-13 PROCEDURE — 3078F DIAST BP <80 MM HG: CPT | Mod: CPTII,,,

## 2023-12-13 PROCEDURE — 80074 ACUTE HEPATITIS PANEL: CPT

## 2023-12-13 PROCEDURE — 1159F PR MEDICATION LIST DOCUMENTED IN MEDICAL RECORD: ICD-10-PCS | Mod: CPTII,,,

## 2023-12-13 PROCEDURE — 3077F SYST BP >= 140 MM HG: CPT | Mod: CPTII,,,

## 2023-12-13 PROCEDURE — 84153 ASSAY OF PSA TOTAL: CPT

## 2023-12-13 PROCEDURE — 83036 HEMOGLOBIN GLYCOSYLATED A1C: CPT

## 2023-12-13 PROCEDURE — 1160F PR REVIEW ALL MEDS BY PRESCRIBER/CLIN PHARMACIST DOCUMENTED: ICD-10-PCS | Mod: CPTII,,,

## 2023-12-13 PROCEDURE — 73110 X-RAY EXAM OF WRIST: CPT | Mod: TC,PN,LT

## 2023-12-13 PROCEDURE — 3008F PR BODY MASS INDEX (BMI) DOCUMENTED: ICD-10-PCS | Mod: CPTII,,,

## 2023-12-13 PROCEDURE — 36415 COLL VENOUS BLD VENIPUNCTURE: CPT

## 2023-12-13 PROCEDURE — 99214 PR OFFICE/OUTPT VISIT, EST, LEVL IV, 30-39 MIN: ICD-10-PCS | Mod: S$PBB,,,

## 2023-12-13 PROCEDURE — 1160F RVW MEDS BY RX/DR IN RCRD: CPT | Mod: CPTII,,,

## 2023-12-13 PROCEDURE — 3078F PR MOST RECENT DIASTOLIC BLOOD PRESSURE < 80 MM HG: ICD-10-PCS | Mod: CPTII,,,

## 2023-12-13 PROCEDURE — 3077F PR MOST RECENT SYSTOLIC BLOOD PRESSURE >= 140 MM HG: ICD-10-PCS | Mod: CPTII,,,

## 2023-12-13 PROCEDURE — 86780 TREPONEMA PALLIDUM: CPT

## 2023-12-13 PROCEDURE — 87389 HIV-1 AG W/HIV-1&-2 AB AG IA: CPT

## 2023-12-13 RX ORDER — PREDNISONE 10 MG/1
TABLET ORAL
Qty: 10 TABLET | Refills: 0 | Status: SHIPPED | OUTPATIENT
Start: 2023-12-13 | End: 2023-12-20

## 2023-12-13 RX ORDER — CETIRIZINE HYDROCHLORIDE 10 MG/1
10 TABLET ORAL EVERY MORNING
COMMUNITY
Start: 2023-10-24

## 2023-12-13 RX ORDER — BENAZEPRIL HYDROCHLORIDE 40 MG/1
40 TABLET ORAL DAILY
Qty: 90 TABLET | Refills: 3 | Status: SHIPPED | OUTPATIENT
Start: 2023-12-13 | End: 2024-12-12

## 2023-12-13 RX ORDER — FLUTICASONE PROPIONATE 50 MCG
SPRAY, SUSPENSION (ML) NASAL
COMMUNITY
Start: 2023-10-24 | End: 2024-01-16 | Stop reason: SDUPTHER

## 2023-12-13 RX ORDER — NIFEDIPINE 60 MG/1
60 TABLET, EXTENDED RELEASE ORAL DAILY
Qty: 90 TABLET | Refills: 3 | Status: SHIPPED | OUTPATIENT
Start: 2023-12-13 | End: 2024-12-12

## 2023-12-13 NOTE — ASSESSMENT & PLAN NOTE
Went to ED after previous office visit and received IV medications and PRN clonidine. Was started on amlodipine and benazepril 40 mg.  Still hypertensive in clinic today, does not want to take more than 2 blood pressure medications so we will stopping amlodipine and start nifedipine 60 mg.

## 2023-12-13 NOTE — ASSESSMENT & PLAN NOTE
CT of sinus on 12/6/23 showed Loss of pneumatization of the left mastoid air cells with opacification and fluid.  He has appointment on 1/16 with ENT.   Patient was given the phone number to all providers that they were referred to, encouraged patient to reach out to these providers directly should they need to change appointment. Patient verbalized understanding of these directions.

## 2023-12-13 NOTE — PROGRESS NOTES
Patient Name: Rodriguez Dwyer     : 1960    MRN: [id number]     Subjective:     Patient ID: Rodriguez Dwyer is a 63 y.o. male.    Chief Complaint:   Chief Complaint   Patient presents with    Follow-up     One week f/u. States feels tired from new medications.        HPI: 2023: Patient presents to clinic today to review labs. Discussed labs at length with patient and all questions were answered to patients understanding. Patient has new complaint today of left wrist pain, states that he worked at [employer] for about 10 years in the repetitive motion caused inflammation and pain to his left wrist.  States that at times he feels that it is weaker than his right hand, fear condition not present on exam today.  Requesting referral to orthopedic surgeon that his daughter works at.  Additionally not all blood work was collected during ED visit, we will collect the rest of his wellness blood work.  He is hypertensive in clinic today but denies blurred vision, chest pain, shortness and breath or edema.  States that he does have occasional headaches still but it has improved.  Has been feeling fatigue since starting blood pressure medication.  He also has an upcoming appointment in January with ENT with audiology appointment after.  Patient was prescribed antibiotics in the emergency department as well as a nasal spray which he is no longer taking.  He did complete his antibiotics. Patient denies chest pain, palpitations, and shortness of breath.  Patient denies fever, night sweats, chills, nausea, vomiting, diarrhea, constipation, weight loss, and changes in appetite.      2023: Patient presents to clinic today to establish care, patient has no chronic medical conditions which they have ever been managed.  Patient does not take any daily medications that they need refilled. Patient denies chest pain, palpitations, and shortness of breath. He is extremely hypertensive in clinic today and states he went to ED about a  "month ago for head pressure but was told his ears were "infected". Ears clear today.  Endorses tinnitus in his left ear as well as bilateral hearing loss.  Thought this was all because of an infection in his ears.  Endorses "constant head pressure" since this ED visit.  Patient denies fever, night sweats, chills, nausea, vomiting, diarrhea, constipation, weight loss, and changes in appetite.  Wellness labs (CBC, CMP, A1c, FLP, TSH, Free T4) ordered for patient today, with added PSA for prostate cancer screening screening.        ROS:       12 point review of systems conducted, negative except as stated in the history of present illness. See HPI for details.    History:     History reviewed. No pertinent past medical history.     History reviewed. No pertinent surgical history.    History reviewed. No pertinent family history.     Social History     Tobacco Use    Smoking status: Never    Smokeless tobacco: Never   Substance and Sexual Activity    Alcohol use: Yes     Comment: occ    Drug use: Never    Sexual activity: Not on file       Current Outpatient Medications   Medication Instructions    azelastine-fluticasone (DYMISTA) 137-50 mcg/spray Spry nassal spray 1 spray, Each Nostril, 2 times daily    benazepriL (LOTENSIN) 40 mg, Oral, Daily    cetirizine (ZYRTEC) 10 mg, Oral, Every morning    doxycycline (VIBRAMYCIN) 100 mg, Oral, 2 times daily    fluticasone propionate (FLONASE) 50 mcg/actuation nasal spray USE 1 SPRAY IN EACH NOSTRIL ONCE EVERY MORNING FOR 10 DAYS    NIFEdipine (PROCARDIA-XL) 60 mg, Oral, Daily    predniSONE (DELTASONE) 10 MG tablet Take 2 tablets (20 mg total) by mouth once daily for 3 days, THEN 1 tablet (10 mg total) once daily for 4 days.        Review of patient's allergies indicates:   Allergen Reactions    Penicillins Other (See Comments)       Objective:     Visit Vitals  BP (!) 145/72 (BP Location: Left arm, Patient Position: Sitting)   Pulse 79   Temp 97.8 °F (36.6 °C) (Oral)   Resp 20 " "  Ht 5' 9" (1.753 m)   Wt 102.4 kg (225 lb 12.8 oz)   SpO2 97%   BMI 33.34 kg/m²       Physical Examination:     Physical Exam  Constitutional:       General: He is not in acute distress.     Appearance: Normal appearance. He is not ill-appearing.   Cardiovascular:      Rate and Rhythm: Normal rate and regular rhythm.      Heart sounds: Normal heart sounds.   Pulmonary:      Effort: Pulmonary effort is normal. No respiratory distress.      Breath sounds: Normal breath sounds.   Musculoskeletal:      Right wrist: Normal.      Left wrist: Swelling and tenderness present. No bony tenderness or crepitus. Normal range of motion. Normal pulse.      Cervical back: Normal range of motion.   Skin:     General: Skin is warm and dry.   Neurological:      Mental Status: He is alert and oriented to person, place, and time.   Psychiatric:         Mood and Affect: Mood normal.         Behavior: Behavior normal.         Lab Results:     Chemistry:  Lab Results   Component Value Date     12/06/2023    K 4.3 12/06/2023    CHLORIDE 104 12/06/2023    BUN 6.1 (L) 12/06/2023    CREATININE 0.78 12/06/2023    EGFRNORACEVR >60 12/06/2023    GLUCOSE 107 12/06/2023    CALCIUM 9.3 12/06/2023    ALKPHOS 90 12/06/2023    LABPROT 7.2 12/06/2023    ALBUMIN 3.8 12/06/2023    AST 16 12/06/2023    ALT 13 12/06/2023    MG 1.80 12/06/2023    TSH 2.243 12/06/2023        No results found for: "HGBA1C", "MICROALBCREA"     Hematology:  Lab Results   Component Value Date    WBC 8.04 12/06/2023    HGB 10.6 (L) 12/06/2023    HCT 37.4 (L) 12/06/2023     (H) 12/06/2023       Lipid Panel:  Lab Results   Component Value Date    CHOL 212 (H) 12/06/2023    HDL 44 12/06/2023    .00 (H) 12/06/2023    TRIG 105 12/06/2023    TOTALCHOLEST 5 12/06/2023        Urine:  Lab Results   Component Value Date    COLORUA Straw 12/06/2023    APPEARANCEUA Clear 12/06/2023    SGUA 1.010 12/06/2023    PHUA 8.0 12/06/2023    PROTEINUA Negative 12/06/2023    " GLUCOSEUA Negative 12/06/2023    KETONESUA Negative 12/06/2023    BLOODUA Negative 12/06/2023    NITRITESUA Negative 12/06/2023    LEUKOCYTESUR Negative 12/06/2023        Assessment:          ICD-10-CM ICD-9-CM   1. Hypertension, unspecified type  I10 401.9   2. Encounter for screening for malignant neoplasm of prostate  Z12.5 V76.44   3. Encounter for wellness examination  Z00.00 V70.0   4. Left wrist pain  M25.532 719.43   5. Sinus congestion  R09.81 478.19   6. Other hyperlipidemia  E78.49 272.4        Plan:     1. Hypertension, unspecified type  Overview:  Low Sodium Diet (Dash Diet - less than 2 grams of sodium per day).  Monitor Blood Pressure daily and log. Report any consistent numbers greater than 140/90.  Smoking Cessation encouraged to aid in BP reduction.  Maintain healthy weight with goal BMI <30. Exercise 30 minutes per day 5 days per week        Assessment & Plan:  Went to ED after previous office visit and received IV medications and PRN clonidine. Was started on amlodipine and benazepril 40 mg.  Still hypertensive in clinic today, does not want to take more than 2 blood pressure medications so we will stopping amlodipine and start nifedipine 60 mg.    Orders:  -     NIFEdipine (PROCARDIA-XL) 60 MG (OSM) 24 hr tablet; Take 1 tablet (60 mg total) by mouth once daily.  Dispense: 90 tablet; Refill: 3  -     benazepriL (LOTENSIN) 40 MG tablet; Take 1 tablet (40 mg total) by mouth once daily.  Dispense: 90 tablet; Refill: 3    2. Encounter for screening for malignant neoplasm of prostate  -     PSA, Screening    3. Encounter for wellness examination  -     Hemoglobin A1C  -     Hepatitis Panel, Acute  -     HIV 1/2 Ag/Ab (4th Gen)  -     SYPHILIS ANTIBODY (WITH REFLEX RPR)  -     CBC Auto Differential    4. Left wrist pain  Comments:  states works at [employer] with repetitive motions for long hours, requesting referral to Dr. Carpio as his daughter works there.  Orders:  -     X-Ray Wrist Complete Left;  Future; Expected date: 12/13/2023  -     predniSONE (DELTASONE) 10 MG tablet; Take 2 tablets (20 mg total) by mouth once daily for 3 days, THEN 1 tablet (10 mg total) once daily for 4 days.  Dispense: 10 tablet; Refill: 0  -     Ambulatory referral/consult to Orthopedics; Future; Expected date: 12/13/2023    5. Sinus congestion  Assessment & Plan:  CT of sinus on 12/6/23 showed Loss of pneumatization of the left mastoid air cells with opacification and fluid.  He has appointment on 1/16 with ENT.   Patient was given the phone number to all providers that they were referred to, encouraged patient to reach out to these providers directly should they need to change appointment. Patient verbalized understanding of these directions.        6. Other hyperlipidemia  Assessment & Plan:  Stressed importance of dietary modifications. Follow a low cholesterol, low saturated fat diet with less that 200mg of cholesterol a day.  Avoid fried foods and high saturated fats (high saturated fats less than 7% of calories).  Add Flax Seed/Fish Oil supplements to diet. Increase dietary fiber.  Regular exercise can reduce LDL and raise HDL. Stressed importance of physical activity 5 times per week for 30 minutes per day.                Follow up in about 2 weeks (around 12/27/2023) for BP recheck (started on nifedipine 60mg at visit).    Future Appointments   Date Time Provider Department Center   1/3/2024  7:15 AM Rayna Preston NP [facility]   1/16/2024  9:00 AM AUDIOLOGIST 1, [facility] AUDIOLOGY [facility] AUDIO [place]   1/16/2024  1:00 PM Mica Bragg FNP [facility] ENT [place]        Rayna Preston NP

## 2023-12-13 NOTE — ASSESSMENT & PLAN NOTE
Stressed importance of dietary modifications. Follow a low cholesterol, low saturated fat diet with less that 200mg of cholesterol a day.  Avoid fried foods and high saturated fats (high saturated fats less than 7% of calories).  Add Flax Seed/Fish Oil supplements to diet. Increase dietary fiber.  Regular exercise can reduce LDL and raise HDL. Stressed importance of physical activity 5 times per week for 30 minutes per day.

## 2024-01-03 ENCOUNTER — OFFICE VISIT (OUTPATIENT)
Dept: FAMILY MEDICINE | Facility: CLINIC | Age: 64
End: 2024-01-03
Payer: MEDICAID

## 2024-01-03 VITALS
RESPIRATION RATE: 20 BRPM | SYSTOLIC BLOOD PRESSURE: 139 MMHG | BODY MASS INDEX: 33.35 KG/M2 | HEIGHT: 69 IN | HEART RATE: 74 BPM | TEMPERATURE: 98 F | DIASTOLIC BLOOD PRESSURE: 84 MMHG | WEIGHT: 225.19 LBS | OXYGEN SATURATION: 97 %

## 2024-01-03 DIAGNOSIS — N39.43 DRIBBLING OF URINE: Primary | ICD-10-CM

## 2024-01-03 DIAGNOSIS — I10 HYPERTENSION, UNSPECIFIED TYPE: ICD-10-CM

## 2024-01-03 PROCEDURE — 99214 OFFICE O/P EST MOD 30 MIN: CPT | Mod: PBBFAC,PN

## 2024-01-03 PROCEDURE — 1159F MED LIST DOCD IN RCRD: CPT | Mod: CPTII,,,

## 2024-01-03 PROCEDURE — 3075F SYST BP GE 130 - 139MM HG: CPT | Mod: CPTII,,,

## 2024-01-03 PROCEDURE — 1160F RVW MEDS BY RX/DR IN RCRD: CPT | Mod: CPTII,,,

## 2024-01-03 PROCEDURE — 3008F BODY MASS INDEX DOCD: CPT | Mod: CPTII,,,

## 2024-01-03 PROCEDURE — 99214 OFFICE O/P EST MOD 30 MIN: CPT | Mod: S$PBB,,,

## 2024-01-03 PROCEDURE — 3079F DIAST BP 80-89 MM HG: CPT | Mod: CPTII,,,

## 2024-01-03 RX ORDER — TAMSULOSIN HYDROCHLORIDE 0.4 MG/1
0.4 CAPSULE ORAL DAILY
Qty: 90 CAPSULE | Refills: 1 | Status: SHIPPED | OUTPATIENT
Start: 2024-01-03 | End: 2024-07-01

## 2024-01-03 NOTE — ASSESSMENT & PLAN NOTE
Stable and well controlled. Continue current medication. Limit salt in diet. Monitor BP and notify clinic for consistently elevated BP >140/90.    Continue nifedipine 60 mg daily as well as benazepril 40 mg daily

## 2024-01-03 NOTE — PROGRESS NOTES
"Patient Name: Rodriguez Dwyer     : 1960    MRN: [id number]     Subjective:     Patient ID: Rodriguez Dwyer is a 63 y.o. male.    Chief Complaint:   Chief Complaint   Patient presents with    Follow-up     2 week f/u. BP recheck. States was feeling "sleepy" at first due to new medications, however, now is getting better.         HPI: 2024:  Patient presents to clinic today for routine blood pressure follow-up, increase blood pressure medication at last office visit.  Patient is normotensive in clinic today and denies any headache, blurred vision, chest pain, shortness on breath or palpitations.  Does still complain of nasal congestion and facial pain associated to sinuses.  Has an appointment on  to establish care with ENT and have audiology appointment that day as well.  Patient states that he has not yet heard from orthopedic surgeon he requested to be referred to but his daughter will be getting him an appointment.  No other orthopedic complaints.  Does complain of occasional dribbling with urinary hesitation, increased urination during day. Patient denies chest pain, palpitations, and shortness of breath.  Patient denies fever, night sweats, chills, nausea, vomiting, diarrhea, constipation, weight loss, and changes in appetite.    2023: Patient presents to clinic today to review labs. Discussed labs at length with patient and all questions were answered to patients understanding. Patient has new complaint today of left wrist pain, states that he worked at [employer] for about 10 years in the repetitive motion caused inflammation and pain to his left wrist.  States that at times he feels that it is weaker than his right hand, fear condition not present on exam today.  Requesting referral to orthopedic surgeon that his daughter works at.  Additionally not all blood work was collected during ED visit, we will collect the rest of his wellness blood work.  He is hypertensive in clinic today but " "denies blurred vision, chest pain, shortness and breath or edema.  States that he does have occasional headaches still but it has improved.  Has been feeling fatigue since starting blood pressure medication.  He also has an upcoming appointment in January with ENT with audiology appointment after.  Patient was prescribed antibiotics in the emergency department as well as a nasal spray which he is no longer taking.  He did complete his antibiotics. Patient denies chest pain, palpitations, and shortness of breath.  Patient denies fever, night sweats, chills, nausea, vomiting, diarrhea, constipation, weight loss, and changes in appetite.      12/06/2023: Patient presents to clinic today to establish care, patient has no chronic medical conditions which they have ever been managed.  Patient does not take any daily medications that they need refilled. Patient denies chest pain, palpitations, and shortness of breath. He is extremely hypertensive in clinic today and states he went to ED about a month ago for head pressure but was told his ears were "infected". Ears clear today.  Endorses tinnitus in his left ear as well as bilateral hearing loss.  Thought this was all because of an infection in his ears.  Endorses "constant head pressure" since this ED visit.  Patient denies fever, night sweats, chills, nausea, vomiting, diarrhea, constipation, weight loss, and changes in appetite.  Wellness labs (CBC, CMP, A1c, FLP, TSH, Free T4) ordered for patient today, with added PSA for prostate cancer screening screening.        ROS:       12 point review of systems conducted, negative except as stated in the history of present illness. See HPI for details.    History:     History reviewed. No pertinent past medical history.     History reviewed. No pertinent surgical history.    History reviewed. No pertinent family history.     Social History     Tobacco Use    Smoking status: Never    Smokeless tobacco: Never   Substance and " "Sexual Activity    Alcohol use: Yes     Comment: occ    Drug use: Never    Sexual activity: Not on file       Current Outpatient Medications   Medication Instructions    azelastine-fluticasone (DYMISTA) 137-50 mcg/spray Spry nassal spray 1 spray, Each Nostril, 2 times daily    benazepriL (LOTENSIN) 40 mg, Oral, Daily    cetirizine (ZYRTEC) 10 mg, Oral, Every morning    fluticasone propionate (FLONASE) 50 mcg/actuation nasal spray USE 1 SPRAY IN EACH NOSTRIL ONCE EVERY MORNING FOR 10 DAYS    NIFEdipine (PROCARDIA-XL) 60 mg, Oral, Daily    tamsulosin (FLOMAX) 0.4 mg, Oral, Daily        Review of patient's allergies indicates:   Allergen Reactions    Penicillins Other (See Comments)       Objective:     Visit Vitals  /84 (BP Location: Left arm, Patient Position: Sitting)   Pulse 74   Temp 97.7 °F (36.5 °C) (Oral)   Resp 20   Ht 5' 9" (1.753 m)   Wt 102.2 kg (225 lb 3.2 oz)   SpO2 97%   BMI 33.26 kg/m²       Physical Examination:     Physical Exam  Constitutional:       General: He is not in acute distress.     Appearance: Normal appearance. He is not ill-appearing.   Cardiovascular:      Rate and Rhythm: Normal rate and regular rhythm.      Heart sounds: Normal heart sounds.   Pulmonary:      Effort: Pulmonary effort is normal. No respiratory distress.      Breath sounds: Normal breath sounds.   Musculoskeletal:      Cervical back: Normal range of motion.   Skin:     General: Skin is warm and dry.   Neurological:      Mental Status: He is alert and oriented to person, place, and time.   Psychiatric:         Mood and Affect: Mood normal.         Behavior: Behavior normal.         Lab Results:     Chemistry:  Lab Results   Component Value Date     12/06/2023    K 4.3 12/06/2023    CHLORIDE 104 12/06/2023    BUN 6.1 (L) 12/06/2023    CREATININE 0.78 12/06/2023    EGFRNORACEVR >60 12/06/2023    GLUCOSE 107 12/06/2023    CALCIUM 9.3 12/06/2023    ALKPHOS 90 12/06/2023    LABPROT 7.2 12/06/2023    ALBUMIN 3.8 " 12/06/2023    AST 16 12/06/2023    ALT 13 12/06/2023    MG 1.80 12/06/2023    TSH 2.243 12/06/2023    PSA 2.74 12/13/2023        Lab Results   Component Value Date    HGBA1C 5.6 12/13/2023        Hematology:  Lab Results   Component Value Date    WBC 9.04 12/13/2023    HGB 10.8 (L) 12/13/2023    HCT 39.3 (L) 12/13/2023     (H) 12/13/2023       Lipid Panel:  Lab Results   Component Value Date    CHOL 212 (H) 12/06/2023    HDL 44 12/06/2023    .00 (H) 12/06/2023    TRIG 105 12/06/2023    TOTALCHOLEST 5 12/06/2023        Urine:  Lab Results   Component Value Date    COLORUA Straw 12/06/2023    APPEARANCEUA Clear 12/06/2023    SGUA 1.010 12/06/2023    PHUA 8.0 12/06/2023    PROTEINUA Negative 12/06/2023    GLUCOSEUA Negative 12/06/2023    KETONESUA Negative 12/06/2023    BLOODUA Negative 12/06/2023    NITRITESUA Negative 12/06/2023    LEUKOCYTESUR Negative 12/06/2023        Assessment:          ICD-10-CM ICD-9-CM   1. Dribbling of urine  N39.43 788.35   2. Hypertension, unspecified type  I10 401.9        Plan:     1. Dribbling of urine  Comments:  trial of flomax to control symptoms  Orders:  -     tamsulosin (FLOMAX) 0.4 mg Cap; Take 1 capsule (0.4 mg total) by mouth once daily.  Dispense: 90 capsule; Refill: 1    2. Hypertension, unspecified type  Overview:  Low Sodium Diet (Dash Diet - less than 2 grams of sodium per day).  Monitor Blood Pressure daily and log. Report any consistent numbers greater than 140/90.  Smoking Cessation encouraged to aid in BP reduction.  Maintain healthy weight with goal BMI <30. Exercise 30 minutes per day 5 days per week        Assessment & Plan:  Stable and well controlled. Continue current medication. Limit salt in diet. Monitor BP and notify clinic for consistently elevated BP >140/90.    Continue nifedipine 60 mg daily as well as benazepril 40 mg daily           Follow up in about 6 months (around 7/3/2024) for HLD labs, routine follow up.    Future Appointments   Date  Time Provider Department Center   1/16/2024  9:00 AM AUDIOLOGIST 1, [facility] AUDIOLOGY [facility] AUDIO [place]    1/16/2024  1:00 PM Mica Bragg FNP [facility] ENT [place]    7/5/2024  7:00 AM Rayna Preston NP [facility]        Rayna Preston NP

## 2024-01-04 ENCOUNTER — PATIENT MESSAGE (OUTPATIENT)
Dept: ADMINISTRATIVE | Facility: HOSPITAL | Age: 64
End: 2024-01-04
Payer: MEDICAID

## 2024-01-05 DIAGNOSIS — Z12.11 SCREENING FOR COLON CANCER: ICD-10-CM

## 2024-01-16 ENCOUNTER — OFFICE VISIT (OUTPATIENT)
Dept: OTOLARYNGOLOGY | Facility: CLINIC | Age: 64
End: 2024-01-16
Payer: MEDICAID

## 2024-01-16 DIAGNOSIS — J32.4 CHRONIC PANSINUSITIS: Primary | ICD-10-CM

## 2024-01-16 DIAGNOSIS — J30.9 ALLERGIC RHINITIS, UNSPECIFIED SEASONALITY, UNSPECIFIED TRIGGER: ICD-10-CM

## 2024-01-16 DIAGNOSIS — R43.8 HYPOSMIA: ICD-10-CM

## 2024-01-16 DIAGNOSIS — R09.81 NASAL CONGESTION: ICD-10-CM

## 2024-01-16 PROCEDURE — 1159F MED LIST DOCD IN RCRD: CPT | Mod: CPTII,95,, | Performed by: NURSE PRACTITIONER

## 2024-01-16 PROCEDURE — 4010F ACE/ARB THERAPY RXD/TAKEN: CPT | Mod: CPTII,95,, | Performed by: NURSE PRACTITIONER

## 2024-01-16 PROCEDURE — 99204 OFFICE O/P NEW MOD 45 MIN: CPT | Mod: 95,,, | Performed by: NURSE PRACTITIONER

## 2024-01-16 RX ORDER — FLUTICASONE PROPIONATE 50 MCG
1 SPRAY, SUSPENSION (ML) NASAL 2 TIMES DAILY
Qty: 16 G | Refills: 11 | Status: SHIPPED | OUTPATIENT
Start: 2024-01-16

## 2024-01-16 RX ORDER — PREDNISONE 20 MG/1
20 TABLET ORAL SEE ADMIN INSTRUCTIONS
Qty: 21 TABLET | Refills: 0 | Status: SHIPPED | OUTPATIENT
Start: 2024-01-16 | End: 2024-01-30

## 2024-01-16 RX ORDER — AZELASTINE 1 MG/ML
1 SPRAY, METERED NASAL 2 TIMES DAILY
Qty: 30 ML | Refills: 5 | Status: SHIPPED | OUTPATIENT
Start: 2024-01-16 | End: 2025-01-15

## 2024-01-16 NOTE — PROGRESS NOTES
Audio Only Telehealth Visit     The patient location is: [facility]  The chief complaint leading to consultation is: sinus concerns  Visit type: Virtual visit with audio only (telephone)  Total time spent on visit: 45 min     The reason for the audio only service rather than synchronous audio and video virtual visit was related to technical difficulties or patient preference/necessity.         Each patient to whom I provide medical services by telemedicine is:  (1) informed of the relationship between the physician and patient and the respective role of any other health care provider with respect to management of the patient; and (2) notified that they may decline to receive medical services by telemedicine and may withdraw from such care at any time. Patient verbally consented to receive this service via voice-only telephone call.       HPI: 01/16/2024: 63 y.o. male referred for sinus concerns. Endorses nasal congestion, rhinitis, hyposmia, and itchy/watery eyes. States his head always feels clogged up. Denies facial pain/pressure or PND. States he has been on 3 rounds of abx since October which briefly relieve symptoms (does not recall specific meds). Has not been on steroids for sinuses. States he moved here from [place] in September, and his symptoms began immediately afterward. Has used saline rinses daily for years. Used flonse/astelin BID for a couple weeks as prescribed until he ran out. Also c/o constant b/l tinnitus which began last fall and associated HL which also began in September. Reports his ears feel stopped up and pop when he swallows or blows his nose. Denies any previous otologic hx. States that many years ago he had a nasal and throat surgery for snoring which did not help. Hx of RAÚL but states he did not tolerate PAP therapy. Reports remote hx of nasal fracture.    Imaging:        Assessment and plan:  63 y.o. male with CRS. CT sinus with chronic pansinusitis changes, completely opacified left  maxillary and upper nasal cavity, likely polypoid dz. Left mastoid with mild opacification, no destruction, no ME dz- reviewed. D/w Whitney Marin & Nichelle- Would likely benefit from ESS.    - Azithromycin BID x 2 weeks  - Prednisone taper  - NSI BID  - Flonase + astelin BID (reviewed technique)  - RAST  - RTC 3-4 weeks Res template    Mica Bragg NP    This service was not originating from a related E/M service provided within the previous 7 days nor will  to an E/M service or procedure within the next 24 hours or my soonest available appointment.  Prevailing standard of care was able to be met in this audio-only visit.

## 2024-02-19 ENCOUNTER — CLINICAL SUPPORT (OUTPATIENT)
Dept: AUDIOLOGY | Facility: HOSPITAL | Age: 64
End: 2024-02-19
Payer: MEDICAID

## 2024-02-19 DIAGNOSIS — H90.3 SENSORINEURAL HEARING LOSS, BILATERAL: Primary | ICD-10-CM

## 2024-02-19 DIAGNOSIS — H93.12 TINNITUS OF LEFT EAR: ICD-10-CM

## 2024-02-19 DIAGNOSIS — H91.93 BILATERAL CHANGE IN HEARING: ICD-10-CM

## 2024-02-19 PROCEDURE — 92567 TYMPANOMETRY: CPT | Performed by: AUDIOLOGIST

## 2024-02-19 PROCEDURE — 92557 COMPREHENSIVE HEARING TEST: CPT | Performed by: AUDIOLOGIST

## 2024-02-19 NOTE — PROGRESS NOTES
Hearing Evaluation        Patient History: Mr. Dwyer is in for a hearing evaluation reporting constant bilateral tinnitus, onset following sinus infection.  All middle ear issues has since resolved but the tinnitus continues.  He denies vertigo and a history of noise exposure.  All additional history is unremarkable.        Test Results:                    Pure Tone Testing:      Right ear:       Essentially normal peripheral hearing sensitivity with the exception of a mild sensorineural hearing loss at 8kHz. Speech reception threshold is in agreement with puretone findings.  Discrimination score of 100% is considered excellent.        Left ear:          Mild to moderately severe sensorineural hearing loss from 3k-8kHz. Speech reception threshold is in agreement with puretone findings.  Discrimination score of 100% is considered excellent.                                                                            Tympanometry:                                           Right ear:       Type 'A' tymp, normal middle ear pressure/function     Left ear:          Type 'As' tymp, minimal (.18mL) mobility                              Interpretations:      Behavioral test findings indicate an asymmetric SNHL, severity worse in the left ear. Speech reception thresholds obtained at 25dB, AU, and are in agreement with puretone findings. Speech discrimination scores of 100%, AU, are considered excellent.  Immittance measures indicate normal middle ear pressure/function, right ear and minimal TM mobility, left ear.            Recommendations:   Continue with ENT follow up  Annual hearing evaluations

## 2024-02-27 ENCOUNTER — LAB VISIT (OUTPATIENT)
Dept: LAB | Facility: HOSPITAL | Age: 64
End: 2024-02-27
Attending: NURSE PRACTITIONER
Payer: MEDICAID

## 2024-02-27 ENCOUNTER — OFFICE VISIT (OUTPATIENT)
Dept: OTOLARYNGOLOGY | Facility: CLINIC | Age: 64
End: 2024-02-27
Payer: MEDICAID

## 2024-02-27 VITALS
HEART RATE: 84 BPM | WEIGHT: 229.31 LBS | SYSTOLIC BLOOD PRESSURE: 130 MMHG | DIASTOLIC BLOOD PRESSURE: 81 MMHG | RESPIRATION RATE: 20 BRPM | BODY MASS INDEX: 33.96 KG/M2 | HEIGHT: 69 IN

## 2024-02-27 DIAGNOSIS — J30.9 ALLERGIC RHINITIS, UNSPECIFIED SEASONALITY, UNSPECIFIED TRIGGER: ICD-10-CM

## 2024-02-27 DIAGNOSIS — J32.4 CHRONIC PANSINUSITIS: Primary | ICD-10-CM

## 2024-02-27 DIAGNOSIS — H90.42 SENSORINEURAL HEARING LOSS (SNHL) OF LEFT EAR WITH UNRESTRICTED HEARING OF RIGHT EAR: ICD-10-CM

## 2024-02-27 DIAGNOSIS — R09.81 NASAL CONGESTION: ICD-10-CM

## 2024-02-27 DIAGNOSIS — H90.3 ASYMMETRIC SNHL (SENSORINEURAL HEARING LOSS): ICD-10-CM

## 2024-02-27 DIAGNOSIS — H93.12 TINNITUS OF LEFT EAR: ICD-10-CM

## 2024-02-27 PROCEDURE — 86008 ALLG SPEC IGE RECOMB EA: CPT

## 2024-02-27 PROCEDURE — 82785 ASSAY OF IGE: CPT

## 2024-02-27 PROCEDURE — 99214 OFFICE O/P EST MOD 30 MIN: CPT | Mod: PBBFAC | Performed by: OTOLARYNGOLOGY

## 2024-02-27 PROCEDURE — 36415 COLL VENOUS BLD VENIPUNCTURE: CPT

## 2024-02-27 NOTE — PROGRESS NOTES
Patient Name: Rodriguez Dwyer   YOB: 1960     Chief Complaint:  Follow-up for sinusitis and hearing loss       History of Present Illness:  01/16/2024: 63 y.o. male referred for sinus concerns. Endorses nasal congestion, rhinitis, hyposmia, and itchy/watery eyes. States his head always feels clogged up. Denies facial pain/pressure or PND. States he has been on 3 rounds of abx since October which briefly relieve symptoms (does not recall specific meds). Has not been on steroids for sinuses. States he moved here from [place] in September, and his symptoms began immediately afterward. Has used saline rinses daily for years. Used flonse/astelin BID for a couple weeks as prescribed until he ran out. Also c/o constant b/l tinnitus which began last fall and associated HL which also began in September. Reports his ears feel stopped up and pop when he swallows or blows his nose. Denies any previous otologic hx. States that many years ago he had a nasal and throat surgery for snoring which did not help. Hx of RAÚL but states he did not tolerate PAP therapy. Reports remote hx of nasal fracture.    2/27/2024:   Patient presents today for follow-up sinusitis and also hearing loss.  The patient had completed the azithromycin and prednisone that was prescribed at his last visit and is using fluticasone nasal spray and azelastine nasal spray on routine basis as well as his saline irrigations  and is feeling much better.  Currently he has no congestion, pressure, or postnasal drainage.  In regards to his history the patient had indicated that he would undergone sinus surgery 30 years ago but was unsure as to exactly what was done.  Also of note he also undergone a UPPP for treatment of sleep apnea.  He is also status post childhood tonsillectomy and adenoidectomy.  In reviewing the CT scans of his sinuses which had been done on 12/6/2023, it was noted that there was some bony erosion involving the left inferior orbital floor.   The patient does not have a history of orbital trauma.  Some of the bone in the maxillary sinuses, appears to be a sclerotic and some areas and also have a slight moth-eaten appearance in other areas.  He has no complaints in regards to his eyes.  No diplopia or eye pain.  In speaking to him in regards to sleep apnea the patient sleeps well at night and has no daytime sleepiness.  He feels rested in the mornings.  He does not use nasal CPAP.  Of note the patient did not have his ImmunoCAP allergy assay drawn.  It was unaware that it had been ordered.  In regards to his ears his main complaint has been ringing in the left ear which has been present for several months.  It is persistent in his associated with subjective hearing loss.  He does not have any ringing in his right ear.  He has no history of chronic or recurrent ear disease or ear trauma.  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  He was a right-handed shooter.  An audiogram done on 2/19/2024, showed normal hearing in the right ear with the exception of a mild sensorineural hearing loss at 8000 hertz.  In the left ear he had a mild-to-moderately severe sensorineural hearing loss from 3008 1000 hertz.  Speech discrimination was 100% bilaterally with speech reception threshold of 25 decibels in the right ear and left ear.  Type A tympanograms bilaterally.    Past Medical History:  Past Medical History:   Diagnosis Date    Hypertension     Seasonal allergies      Past Surgical History:   Procedure Laterality Date    SINUS SURGERY         Social History:  Social History     Socioeconomic History    Marital status:    Tobacco Use    Smoking status: Former     Current packs/day: 0.00     Average packs/day: 1 pack/day for 21.1 years (21.1 ttl pk-yrs)     Types: Cigarettes, Cigars     Start date: 3/12/1975     Quit date: 4/23/1996      Years since quittin.8    Smokeless tobacco: Never    Tobacco comments:     Quite the day my daughter was born   Substance and Sexual Activity    Alcohol use: Not Currently     Comment: occ    Drug use: Never    Sexual activity: Not Currently     Partners: Female     Birth control/protection: None     Social Determinants of Health     Financial Resource Strain: Low Risk  (12/3/2023)    Overall Financial Resource Strain (CARDIA)     Difficulty of Paying Living Expenses: Not very hard   Food Insecurity: No Food Insecurity (12/3/2023)    Hunger Vital Sign     Worried About Running Out of Food in the Last Year: Never true     Ran Out of Food in the Last Year: Never true   Transportation Needs: No Transportation Needs (12/3/2023)    PRAPARE - Transportation     Lack of Transportation (Medical): No     Lack of Transportation (Non-Medical): No   Physical Activity: Inactive (12/3/2023)    Exercise Vital Sign     Days of Exercise per Week: 0 days     Minutes of Exercise per Session: 0 min   Stress: Stress Concern Present (12/3/2023)    [employer] - Occupational Stress Questionnaire     Feeling of Stress : To some extent   Social Connections: Unknown (12/3/2023)    Social Connection and Isolation Panel [NHANES]     Frequency of Communication with Friends and Family: More than three times a week     Frequency of Social Gatherings with Friends and Family: More than three times a week     Active Member of Clubs or Organizations: No     Attends Club or Organization Meetings: Never     Marital Status:    Housing Stability: Low Risk  (12/3/2023)    Housing Stability Vital Sign     Unable to Pay for Housing in the Last Year: No     Number of Places Lived in the Last Year: 1     Unstable Housing in the Last Year: No       Review of Systems:  Unremarkable except as mentioned above.    Current Medications:  Current Outpatient Medications   Medication Sig    azelastine (ASTELIN) 137 mcg (0.1 %)  "nasal spray 1 spray (137 mcg total) by Nasal route 2 (two) times daily.    benazepriL (LOTENSIN) 40 MG tablet Take 1 tablet (40 mg total) by mouth once daily.    fluticasone propionate (FLONASE) 50 mcg/actuation nasal spray 1 spray (50 mcg total) by Each Nostril route 2 (two) times a day.    NIFEdipine (PROCARDIA-XL) 60 MG (OSM) 24 hr tablet Take 1 tablet (60 mg total) by mouth once daily.    tamsulosin (FLOMAX) 0.4 mg Cap Take 1 capsule (0.4 mg total) by mouth once daily.    cetirizine (ZYRTEC) 10 MG tablet Take 10 mg by mouth every morning.     No current facility-administered medications for this visit.        Allergies:  Review of patient's allergies indicates:   Allergen Reactions    Penicillins Other (See Comments)        Family History:  History reviewed. No pertinent family history.    Physical Exam:  Vital signs:   Vitals:    02/27/24 1256 02/27/24 1258   BP: (!) 154/97 130/81   BP Location: Right arm Right arm   Patient Position: Sitting Sitting   BP Method: Large (Automatic) Large (Automatic)   Pulse: 84    Resp: 20    Weight: 104 kg (229 lb 4.8 oz)    Height: 5' 9" (1.753 m)    General:  Well-developed well-nourished male in no acute distress.  Voice is normal.  Head and face:  Normocephalic.  No facial lesions.  No temporomandibular joint tenderness or click.  Ears:  Right ear-auricle is normally developed.  External auditory canal is clear.  Tympanic membrane is nonerythematous.  No middle ear effusion.  Left ear-auricle is normally developed.  External auditory canal is clear.  Tympanic membrane is nonerythematous.  No middle ear effusion.  Nose:  Nasal dorsum is unremarkable.  No significant septal deviation.  No significant intranasal congestion.  Secretions are clear.  Oral cavity and oropharynx: Tongue and floor mouth are without lesions.  No pharyngeal erythema, exudates, or masses.  Uvula is absent.  Tonsils are also absent.  Neck:  Supple without adenopathy or thyromegaly.  Trachea is in the " midline.  Parotid and submandibular glands are normal to palpation without masses or tenderness.  Eyes:  Extraocular muscles intact.  No nystagmus.  No exophthalmos or enophthalmos.  Neurologic:  Alert and oriented.  Cranial nerves 2-12 are grossly normal.        Assessment/Plan:  63-year-old male with chronic sinusitis which has improved symptomatically with the prescribed medication.  CT scan did show some erosion of the floor left orbit.  Asymmetric sensorineural hearing loss; speech discrimination is excellent bilaterally.  Because of the asymmetry there is a concern for possible retrocochlear pathology.  Some of the asymmetry could also be explained by his history of being a right-handed shooter    Plan:   Continue fluticasone nasal spray 2 puffs each nostril q.d. and azelastine nasal spray 2 puffs each nostril b.i.d. along with saline irrigations b.i.d..  Noise avoidance.  Order MRI scan of the sinuses for for further evaluation due to the presence of the bone erosion.    Order MRI scan of the internal auditory canals to evaluate for retrocochlear pathology.  Weeks.      Rodriguez Montalvo M.D.

## 2024-02-28 ENCOUNTER — HOSPITAL ENCOUNTER (OUTPATIENT)
Dept: RADIOLOGY | Facility: CLINIC | Age: 64
Discharge: HOME | End: 2024-02-28
Attending: ORTHOPAEDIC SURGERY
Payer: MEDICAID

## 2024-02-28 ENCOUNTER — OFFICE VISIT (OUTPATIENT)
Dept: ORTHOPEDICS | Facility: CLINIC | Age: 64
End: 2024-02-28
Payer: MEDICAID

## 2024-02-28 VITALS
HEART RATE: 76 BPM | DIASTOLIC BLOOD PRESSURE: 78 MMHG | BODY MASS INDEX: 35.07 KG/M2 | WEIGHT: 231.38 LBS | HEIGHT: 68 IN | SYSTOLIC BLOOD PRESSURE: 127 MMHG

## 2024-02-28 DIAGNOSIS — M19.032 PRIMARY OSTEOARTHRITIS OF LEFT WRIST: Primary | ICD-10-CM

## 2024-02-28 DIAGNOSIS — M79.642 LEFT HAND PAIN: ICD-10-CM

## 2024-02-28 PROCEDURE — 3008F BODY MASS INDEX DOCD: CPT | Mod: CPTII,,, | Performed by: NURSE PRACTITIONER

## 2024-02-28 PROCEDURE — 73130 X-RAY EXAM OF HAND: CPT | Mod: LT,,, | Performed by: ORTHOPAEDIC SURGERY

## 2024-02-28 PROCEDURE — 3078F DIAST BP <80 MM HG: CPT | Mod: CPTII,,, | Performed by: NURSE PRACTITIONER

## 2024-02-28 PROCEDURE — 99203 OFFICE O/P NEW LOW 30 MIN: CPT | Mod: ,,, | Performed by: NURSE PRACTITIONER

## 2024-02-28 PROCEDURE — 1159F MED LIST DOCD IN RCRD: CPT | Mod: CPTII,,, | Performed by: NURSE PRACTITIONER

## 2024-02-28 PROCEDURE — 3074F SYST BP LT 130 MM HG: CPT | Mod: CPTII,,, | Performed by: NURSE PRACTITIONER

## 2024-02-28 PROCEDURE — 4010F ACE/ARB THERAPY RXD/TAKEN: CPT | Mod: CPTII,,, | Performed by: NURSE PRACTITIONER

## 2024-02-28 RX ORDER — DICLOFENAC SODIUM 75 MG/1
75 TABLET, DELAYED RELEASE ORAL 2 TIMES DAILY
Qty: 28 TABLET | Refills: 2 | Status: SHIPPED | OUTPATIENT
Start: 2024-02-28 | End: 2024-04-22 | Stop reason: SDUPTHER

## 2024-02-28 NOTE — PROGRESS NOTES
Chief Complaint:   Chief Complaint   Patient presents with    Left Hand - Pain     Left hand pain shooting down the thumb side. Reports bump on the top of the wrist. Pain started 2 years ago. OTC Arthritis cream PRN with no relief.        Consulting Physician: No ref. provider found    History of present illness:    he  is a pleasant 63 y.o. year old male  who presents with a  longstanding history of left wrist and hand pain. His pain is along the thumb and wrist joint. It is worse with increased activity. It is somewhat better at rest. He has tried OTC medications, bracing, and activity modification without relief. He's noticed swelling dorsally along the wrist and a mobile cyst along the radial side of the thumb.     Past Medical History:   Diagnosis Date    Hypertension     Seasonal allergies     Unspecified sensorineural hearing loss     left side       Past Surgical History:   Procedure Laterality Date    SINUS SURGERY         Current Outpatient Medications   Medication Sig    azelastine (ASTELIN) 137 mcg (0.1 %) nasal spray 1 spray (137 mcg total) by Nasal route 2 (two) times daily.    benazepriL (LOTENSIN) 40 MG tablet Take 1 tablet (40 mg total) by mouth once daily.    fluticasone propionate (FLONASE) 50 mcg/actuation nasal spray 1 spray (50 mcg total) by Each Nostril route 2 (two) times a day.    NIFEdipine (PROCARDIA-XL) 60 MG (OSM) 24 hr tablet Take 1 tablet (60 mg total) by mouth once daily.    tamsulosin (FLOMAX) 0.4 mg Cap Take 1 capsule (0.4 mg total) by mouth once daily.    cetirizine (ZYRTEC) 10 MG tablet Take 10 mg by mouth every morning.    diclofenac (VOLTAREN) 75 MG EC tablet Take 1 tablet (75 mg total) by mouth 2 (two) times daily.     No current facility-administered medications for this visit.       Review of patient's allergies indicates:   Allergen Reactions    Penicillins Other (See Comments)       Family History   Problem Relation Age of Onset    Arthritis Mother     Heart disease Mother      Heart disease Father        Social History     Socioeconomic History    Marital status:    Tobacco Use    Smoking status: Former     Current packs/day: 0.00     Average packs/day: 1 pack/day for 21.1 years (21.1 ttl pk-yrs)     Types: Cigarettes, Cigars     Start date: 3/12/1975     Quit date: 1996     Years since quittin.8    Smokeless tobacco: Never    Tobacco comments:     Quite the day my daughter was born; 27 years ago   Substance and Sexual Activity    Alcohol use: Yes     Comment: weekends; liquor    Drug use: Never    Sexual activity: Not Currently     Partners: Female     Birth control/protection: None     Social Determinants of Health     Financial Resource Strain: Low Risk  (12/3/2023)    Overall Financial Resource Strain (CARDIA)     Difficulty of Paying Living Expenses: Not very hard   Food Insecurity: No Food Insecurity (12/3/2023)    Hunger Vital Sign     Worried About Running Out of Food in the Last Year: Never true     Ran Out of Food in the Last Year: Never true   Transportation Needs: No Transportation Needs (12/3/2023)    PRAPARE - Transportation     Lack of Transportation (Medical): No     Lack of Transportation (Non-Medical): No   Physical Activity: Inactive (12/3/2023)    Exercise Vital Sign     Days of Exercise per Week: 0 days     Minutes of Exercise per Session: 0 min   Stress: Stress Concern Present (12/3/2023)    [employer] - Occupational Stress Questionnaire     Feeling of Stress : To some extent   Social Connections: Unknown (12/3/2023)    Social Connection and Isolation Panel [NHANES]     Frequency of Communication with Friends and Family: More than three times a week     Frequency of Social Gatherings with Friends and Family: More than three times a week     Active Member of Clubs or Organizations: No     Attends Club or Organization Meetings: Never     Marital Status:    Housing Stability: Low Risk  (12/3/2023)    Housing  "Stability Vital Sign     Unable to Pay for Housing in the Last Year: No     Number of Places Lived in the Last Year: 1     Unstable Housing in the Last Year: No       Review of Systems:    Constitution:   Denies chills, fever, and sweats.  HENT:   Denies headaches or blurry vision.  Cardiovascular:  Denies chest pain or irregular heart beat.  Respiratory:   Denies cough or shortness of breath.  Gastrointestinal:  Denies abdominal pain, nausea, or vomiting.  Musculoskeletal:   Denies muscle cramps.  Neurological:   Denies dizziness or focal weakness.  Psychiatric/Behavior: Normal mental status.  Hematology/Lymph:  Denies bleeding problem or easy bruising/bleeding.  Skin:    Denies rash or suspicious lesions.    Examination:    Vital Signs:    Vitals:    02/28/24 0939   BP: 127/78   Pulse: 76   Weight: 105 kg (231 lb 6.4 oz)   Height: 5' 7.72" (1.72 m)       Body mass index is 35.48 kg/m².    Constitution:   Well-developed, well nourished patient in no acute distress.  Neurological:   Alert and oriented x 3 and cooperative to examination.     Psychiatric/Behavior: Normal mental status.  Respiratory:   No shortness of breath.  Cards:    Pulses palpable and symmetric, brisk cap refill   Eyes:    Extraoccular muscles intact  Skin:    No scars, rash or suspicious lesions.    MSK:   Left-hand and wrist clinically well aligned.  He does have some swelling dorsally along the wrist.  No palpable cyst here.  Range of motion of the wrist and digits are full.  Nontender along the radial styloid.  Positive grind test.  He does have a small mobile cyst along the radial side of the thumb.  Nontender to palpation.  Without erythema or drainage.    Imaging: X-rays ordered and images interpreted today personally by me of 3 views of the left hand show osteoarthritis         Assessment: Primary osteoarthritis of left wrist  -     X-Ray Hand Complete Left; Future; Expected date: 02/28/2024    Other orders  -     diclofenac (VOLTAREN) 75 " MG EC tablet; Take 1 tablet (75 mg total) by mouth 2 (two) times daily.  Dispense: 28 tablet; Refill: 2        Plan:  He has not concerned about the cyst.  We will start him on prescription strength anti-inflammatories for osteoarthritis.  We discussed steroid injections, he declines today. He can follow up if he has any issues

## 2024-03-02 LAB
ALLERGEN ALTERNARIA ALTERNATA IGE (OLG): <0.1 KUA/L
ALLERGEN ASPERGILLUS FUMIGATUS IGE (OLG): <0.1 KUA/L
ALLERGEN BERMUDA GRASS IGE (OLG): 0.14 KUA/L
ALLERGEN BOXELDER MAPLE TREE IGE (OLG): 0.16 KUA/L
ALLERGEN CAT DANDER IGE (OLG): 0.19 KUA/L
ALLERGEN CLADOSPORIUM HERBARUM IGE (OLG): <0.1 KUA/L
ALLERGEN COCKROACH GERMAN IGE (OLG): <0.1 KUA/L
ALLERGEN COMMON RAGWEED IGE (OLG): 0.35 KUA/L
ALLERGEN DOG DANDER IGE (OLG): <0.1 KUA/L
ALLERGEN DUST MITE (D. PTERONYSSINUS) IGE (OLG): <0.1 KUA/L
ALLERGEN DUST MITE (D.FARINAE) IGE (OLG): <0.1 KUA/L
ALLERGEN ELM TREE IGE (OLG): 0.16 KUA/L
ALLERGEN HORSE DANDER IGE (OLG): <0.1 KUA/L
ALLERGEN MOUNTAIN JUNIPER TREE IGE (OLG): 0.12 KUA/L
ALLERGEN MOUSE URINE PROTEINS IGE (OLG): <0.1 KUA/L
ALLERGEN MULBERRY TREE IGE (OLG): <0.1 KUA/L
ALLERGEN OAK TREE IGE (OLG): 0.19 KUA/L
ALLERGEN PECAN HICKORY TREE IGE (OLG): <0.1 KUA/L
ALLERGEN PENICILLIUM CHRYSOGENUM IGE (OLG): <0.1 KUA/L
ALLERGEN PIGWEED IGE (OLG): <0.1 KUA/L
ALLERGEN ROUGH MARSH ELDER IGE (OLG): 0.73 KUA/L
ALLERGEN SILVER BIRCH TREE IGE (OLG): <0.1 KUA/L
ALLERGEN TIMOTHY GRASS IGE (OLG): 0.14 KUA/L
ALLERGEN WALNUT TREE IGE (OLG): 0.17 KUA/L
PHADIOTOP IGE QN: 418 KU/L

## 2024-03-07 LAB
ALLERGEN CAT RFEL D 1 IGE (OLG): 0.21 KUA/L
ALLERGEN CAT RFEL D 2 IGE (OLG): <0.1 KUA/L
ALLERGEN CAT RFEL D 4 IGE (OLG): <0.1 KUA/L
ALLERGEN CAT RFEL D 7 IGE (OLG): <0.1 KUA/L

## 2024-03-18 ENCOUNTER — HOSPITAL ENCOUNTER (OUTPATIENT)
Dept: RADIOLOGY | Facility: HOSPITAL | Age: 64
Discharge: HOME | End: 2024-03-18
Attending: OTOLARYNGOLOGY
Payer: MEDICAID

## 2024-03-18 DIAGNOSIS — H90.3 ASYMMETRIC SNHL (SENSORINEURAL HEARING LOSS): ICD-10-CM

## 2024-03-18 DIAGNOSIS — H90.42 SENSORINEURAL HEARING LOSS (SNHL) OF LEFT EAR WITH UNRESTRICTED HEARING OF RIGHT EAR: ICD-10-CM

## 2024-03-18 DIAGNOSIS — J32.4 CHRONIC PANSINUSITIS: ICD-10-CM

## 2024-03-18 NOTE — PROGRESS NOTES
MRI MAXILLOFACIAL AND BRAIN IAC UNABLE TO BE COMPLETED TODAY DUE TO PATIENT BEING CLAUSTROPHOBIC. PATIENT SAID HE WOULD TALK TO ORDERING PHYSICIAN ABOUT HIS OPTIONS.

## 2024-04-22 RX ORDER — DICLOFENAC SODIUM 75 MG/1
75 TABLET, DELAYED RELEASE ORAL 2 TIMES DAILY
Qty: 28 TABLET | Refills: 2 | Status: SHIPPED | OUTPATIENT
Start: 2024-04-22

## 2024-07-10 ENCOUNTER — HOSPITAL ENCOUNTER (OUTPATIENT)
Dept: RADIOLOGY | Facility: HOSPITAL | Age: 64
Discharge: HOME | End: 2024-07-10
Payer: MEDICAID

## 2024-07-10 ENCOUNTER — OFFICE VISIT (OUTPATIENT)
Dept: FAMILY MEDICINE | Facility: CLINIC | Age: 64
End: 2024-07-10
Payer: MEDICAID

## 2024-07-10 VITALS
OXYGEN SATURATION: 95 % | TEMPERATURE: 99 F | SYSTOLIC BLOOD PRESSURE: 135 MMHG | DIASTOLIC BLOOD PRESSURE: 88 MMHG | WEIGHT: 235.81 LBS | HEART RATE: 82 BPM | RESPIRATION RATE: 18 BRPM | HEIGHT: 67 IN | BODY MASS INDEX: 37.01 KG/M2

## 2024-07-10 DIAGNOSIS — R05.9 COUGH, UNSPECIFIED TYPE: Primary | ICD-10-CM

## 2024-07-10 DIAGNOSIS — N39.43 DRIBBLING OF URINE: ICD-10-CM

## 2024-07-10 DIAGNOSIS — R05.9 COUGH, UNSPECIFIED TYPE: ICD-10-CM

## 2024-07-10 DIAGNOSIS — D64.9 ANEMIA, UNSPECIFIED TYPE: ICD-10-CM

## 2024-07-10 PROCEDURE — 1159F MED LIST DOCD IN RCRD: CPT | Mod: CPTII,,,

## 2024-07-10 PROCEDURE — 3075F SYST BP GE 130 - 139MM HG: CPT | Mod: CPTII,,,

## 2024-07-10 PROCEDURE — 3008F BODY MASS INDEX DOCD: CPT | Mod: CPTII,,,

## 2024-07-10 PROCEDURE — 1160F RVW MEDS BY RX/DR IN RCRD: CPT | Mod: CPTII,,,

## 2024-07-10 PROCEDURE — 99214 OFFICE O/P EST MOD 30 MIN: CPT | Mod: S$PBB,,,

## 2024-07-10 PROCEDURE — 99214 OFFICE O/P EST MOD 30 MIN: CPT | Mod: PBBFAC,25,PN

## 2024-07-10 PROCEDURE — 4010F ACE/ARB THERAPY RXD/TAKEN: CPT | Mod: CPTII,,,

## 2024-07-10 PROCEDURE — 3079F DIAST BP 80-89 MM HG: CPT | Mod: CPTII,,,

## 2024-07-10 PROCEDURE — 71046 X-RAY EXAM CHEST 2 VIEWS: CPT | Mod: TC,PN

## 2024-07-10 RX ORDER — DOXYCYCLINE HYCLATE 100 MG
100 TABLET ORAL 2 TIMES DAILY
Qty: 10 TABLET | Refills: 0 | Status: SHIPPED | OUTPATIENT
Start: 2024-07-10 | End: 2024-07-15

## 2024-07-10 RX ORDER — TAMSULOSIN HYDROCHLORIDE 0.4 MG/1
0.4 CAPSULE ORAL DAILY
Qty: 90 CAPSULE | Refills: 1 | Status: SHIPPED | OUTPATIENT
Start: 2024-07-10 | End: 2025-01-06

## 2024-07-10 RX ORDER — PROMETHAZINE HYDROCHLORIDE AND DEXTROMETHORPHAN HYDROBROMIDE 6.25; 15 MG/5ML; MG/5ML
5 SYRUP ORAL EVERY 4 HOURS PRN
Qty: 118 ML | Refills: 0 | Status: SHIPPED | OUTPATIENT
Start: 2024-07-10 | End: 2024-07-20

## 2024-07-10 RX ORDER — METHYLPREDNISOLONE 4 MG/1
TABLET ORAL
Qty: 21 EACH | Refills: 0 | Status: SHIPPED | OUTPATIENT
Start: 2024-07-10 | End: 2024-07-31

## 2024-07-10 NOTE — PROGRESS NOTES
Patient Name: Rodriguez Dwyer     : 1960    MRN: [id number]     Subjective:     Patient ID: Rodriguez Dwyer is a 63 y.o. male.    Chief Complaint:   Chief Complaint   Patient presents with    Follow-up     F/u appointment. C/o cough, chest congestion x 3 weeks. Needs refill on Flomax.         HPI: 07/10/2024: patient presents for evaluation of nasal congestion and productive cough with sputum described as green. Symptoms began 3 weeks ago. Symptoms have been unchanged since that time.  No known sick contacts other than grandchildren, grandchildren were not diagnosed with any illness. He is normotensive in clinic today, compliant with medications. Denies HA, blurred vision, chest pain, SOB, palpitations or edema. States he has lost hearing in left ear and will continue to follow ENT for management. Patient denies chest pain, palpitations, and shortness of breath.  Patient denies fever, night sweats, chills, nausea, vomiting, diarrhea, constipation, weight loss, and changes in appetite.      2024:  Patient presents to clinic today for routine blood pressure follow-up, increase blood pressure medication at last office visit.  Patient is normotensive in clinic today and denies any headache, blurred vision, chest pain, shortness on breath or palpitations.  Does still complain of nasal congestion and facial pain associated to sinuses.  Has an appointment on  to establish care with ENT and have audiology appointment that day as well.  Patient states that he has not yet heard from orthopedic surgeon he requested to be referred to but his daughter will be getting him an appointment.  No other orthopedic complaints.  Does complain of occasional dribbling with urinary hesitation, increased urination during day. Patient denies chest pain, palpitations, and shortness of breath.  Patient denies fever, night sweats, chills, nausea, vomiting, diarrhea, constipation, weight loss, and changes in  "appetite.    12/13/2023: Patient presents to clinic today to review labs. Discussed labs at length with patient and all questions were answered to patients understanding. Patient has new complaint today of left wrist pain, states that he worked at [employer] for about 10 years in the repetitive motion caused inflammation and pain to his left wrist.  States that at times he feels that it is weaker than his right hand, fear condition not present on exam today.  Requesting referral to orthopedic surgeon that his daughter works at.  Additionally not all blood work was collected during ED visit, we will collect the rest of his wellness blood work.  He is hypertensive in clinic today but denies blurred vision, chest pain, shortness and breath or edema.  States that he does have occasional headaches still but it has improved.  Has been feeling fatigue since starting blood pressure medication.  He also has an upcoming appointment in January with ENT with audiology appointment after.  Patient was prescribed antibiotics in the emergency department as well as a nasal spray which he is no longer taking.  He did complete his antibiotics. Patient denies chest pain, palpitations, and shortness of breath.  Patient denies fever, night sweats, chills, nausea, vomiting, diarrhea, constipation, weight loss, and changes in appetite.      12/06/2023: Patient presents to clinic today to establish care, patient has no chronic medical conditions which they have ever been managed.  Patient does not take any daily medications that they need refilled. Patient denies chest pain, palpitations, and shortness of breath. He is extremely hypertensive in clinic today and states he went to ED about a month ago for head pressure but was told his ears were "infected". Ears clear today.  Endorses tinnitus in his left ear as well as bilateral hearing loss.  Thought this was all because of an infection in his ears.  Endorses "constant head pressure" since this " ED visit.  Patient denies fever, night sweats, chills, nausea, vomiting, diarrhea, constipation, weight loss, and changes in appetite.  Wellness labs (CBC, CMP, A1c, FLP, TSH, Free T4) ordered for patient today, with added PSA for prostate cancer screening screening.        ROS:       12 point review of systems conducted, negative except as stated in the history of present illness. See HPI for details.    History:     Past Medical History:   Diagnosis Date    Hypertension     Seasonal allergies     Unspecified sensorineural hearing loss     left side        Past Surgical History:   Procedure Laterality Date    SINUS SURGERY         Family History   Problem Relation Name Age of Onset    Arthritis Mother Liset     Heart disease Mother Liset     Heart disease Father          Social History     Tobacco Use    Smoking status: Former     Current packs/day: 0.00     Average packs/day: 1 pack/day for 21.1 years (21.1 ttl pk-yrs)     Types: Cigarettes, Cigars     Start date: 3/12/1975     Quit date: 1996     Years since quittin.2    Smokeless tobacco: Never    Tobacco comments:     Quite the day my daughter was born; 27 years ago   Substance and Sexual Activity    Alcohol use: Yes     Comment: weekends; liquor    Drug use: Never    Sexual activity: Not Currently     Partners: Female     Birth control/protection: None       Current Outpatient Medications   Medication Instructions    azelastine (ASTELIN) 137 mcg, Nasal, 2 times daily    benazepriL (LOTENSIN) 40 mg, Oral, Daily    cetirizine (ZYRTEC) 10 mg, Oral, Every morning    diclofenac (VOLTAREN) 75 mg, Oral, 2 times daily    doxycycline (VIBRA-TABS) 100 mg, Oral, 2 times daily    fluticasone propionate (FLONASE) 50 mcg, Each Nostril, 2 times daily    methylPREDNISolone (MEDROL DOSEPACK) 4 mg tablet use as directed    NIFEdipine (PROCARDIA-XL) 60 mg, Oral, Daily    promethazine-dextromethorphan (PROMETHAZINE-DM) 6.25-15 mg/5 mL Syrp 5 mLs, Oral, Every 4 hours PRN  "   tamsulosin (FLOMAX) 0.4 mg, Oral, Daily        Review of patient's allergies indicates:   Allergen Reactions    Penicillins Other (See Comments)       Objective:     Visit Vitals  /88 (BP Location: Left arm, Patient Position: Sitting)   Pulse 82   Temp 98.5 °F (36.9 °C) (Oral)   Resp 18   Ht 5' 7" (1.702 m)   Wt 107 kg (235 lb 12.8 oz)   SpO2 95%   BMI 36.93 kg/m²       Physical Examination:     Physical Exam  Constitutional:       General: He is not in acute distress.     Appearance: Normal appearance. He is not ill-appearing.   HENT:      Nose: Congestion present.   Cardiovascular:      Rate and Rhythm: Normal rate and regular rhythm.      Heart sounds: Normal heart sounds.   Pulmonary:      Effort: Pulmonary effort is normal. No respiratory distress.      Breath sounds: Normal breath sounds.   Chest:      Chest wall: No tenderness.   Abdominal:      General: There is no distension.      Tenderness: There is no guarding.   Musculoskeletal:      Cervical back: Normal range of motion.   Skin:     General: Skin is warm and dry.   Neurological:      Mental Status: He is alert and oriented to person, place, and time.   Psychiatric:         Mood and Affect: Mood normal.         Behavior: Behavior normal.         Lab Results:     Chemistry:  Lab Results   Component Value Date     12/06/2023    K 4.3 12/06/2023    BUN 6.1 (L) 12/06/2023    CREATININE 0.78 12/06/2023    EGFRNORACEVR >60 12/06/2023    GLUCOSE 107 12/06/2023    CALCIUM 9.3 12/06/2023    ALKPHOS 90 12/06/2023    LABPROT 7.2 12/06/2023    ALBUMIN 3.8 12/06/2023    AST 16 12/06/2023    ALT 13 12/06/2023    MG 1.80 12/06/2023    TSH 2.243 12/06/2023    PSA 2.74 12/13/2023        Lab Results   Component Value Date    HGBA1C 5.6 12/13/2023        Hematology:  Lab Results   Component Value Date    WBC 9.04 12/13/2023    HGB 10.8 (L) 12/13/2023    HCT 39.3 (L) 12/13/2023     (H) 12/13/2023       Lipid Panel:  Lab Results   Component Value Date "    CHOL 212 (H) 12/06/2023    HDL 44 12/06/2023    .00 (H) 12/06/2023    TRIG 105 12/06/2023    TOTALCHOLEST 5 12/06/2023        Urine:  Lab Results   Component Value Date    APPEARANCEUA Clear 12/06/2023    SGUA 1.010 12/06/2023    PROTEINUA Negative 12/06/2023    KETONESUA Negative 12/06/2023    LEUKOCYTESUR Negative 12/06/2023        Assessment:          ICD-10-CM ICD-9-CM   1. Cough, unspecified type  R05.9 786.2   2. Dribbling of urine  N39.43 788.35   3. Anemia, unspecified type  D64.9 285.9        Plan:     1. Cough, unspecified type  Comments:  Worsening signs and symptoms discussed.  Rest, fluids, acetaminophen, and humidification.  Orders:  -     X-Ray Chest PA And Lateral; Future; Expected date: 07/10/2024  -     promethazine-dextromethorphan (PROMETHAZINE-DM) 6.25-15 mg/5 mL Syrp; Take 5 mLs by mouth every 4 (four) hours as needed (cough).  Dispense: 118 mL; Refill: 0    2. Dribbling of urine  Comments:  refill flomax, much improved.  Orders:  -     tamsulosin (FLOMAX) 0.4 mg Cap; Take 1 capsule (0.4 mg total) by mouth once daily.  Dispense: 90 capsule; Refill: 1    3. Anemia, unspecified type  Comments:  Anemia panel ordered today  Orders:  -     CBC Auto Differential; Future; Expected date: 07/10/2024  -     Iron and TIBC; Future; Expected date: 07/10/2024  -     Vitamin B12; Future; Expected date: 07/10/2024  -     Reticulocytes; Future; Expected date: 07/10/2024    Other orders  -     methylPREDNISolone (MEDROL DOSEPACK) 4 mg tablet; use as directed  Dispense: 21 each; Refill: 0  -     doxycycline (VIBRA-TABS) 100 MG tablet; Take 1 tablet (100 mg total) by mouth 2 (two) times daily. for 5 days  Dispense: 10 tablet; Refill: 0       Follow up as needed for persistent, worsening cough, or appearance of new symptoms.    Follow up in about 6 months (around 1/10/2025) for annual preventative wellness.    Future Appointments   Date Time Provider Department Center   12/10/2024  7:00 AM Mohan  [address] [facility]        Rayna Preston NP

## 2024-12-05 DIAGNOSIS — I10 HYPERTENSION, UNSPECIFIED TYPE: ICD-10-CM

## 2024-12-05 RX ORDER — BENAZEPRIL HYDROCHLORIDE 40 MG/1
40 TABLET ORAL DAILY
Qty: 90 TABLET | Refills: 2 | Status: SHIPPED | OUTPATIENT
Start: 2024-12-05 | End: 2025-09-01

## 2024-12-05 RX ORDER — NIFEDIPINE 60 MG/1
60 TABLET, EXTENDED RELEASE ORAL DAILY
Qty: 90 TABLET | Refills: 2 | Status: SHIPPED | OUTPATIENT
Start: 2024-12-05 | End: 2025-09-01

## 2024-12-05 NOTE — TELEPHONE ENCOUNTER
----- Message from Taylor sent at 12/4/2024 11:17 AM CST -----  Regarding: refill  Pt needs refill on all heart medications, he states that he has two different kinds of heart medications. Patient not sure of the names of them. He would like it to go to [id number] - [place], [place] - [address] [facility] [address]   Phone: [phone]  Fax: [phone]

## 2025-01-06 DIAGNOSIS — N39.43 DRIBBLING OF URINE: ICD-10-CM

## 2025-01-07 RX ORDER — TAMSULOSIN HYDROCHLORIDE 0.4 MG/1
0.4 CAPSULE ORAL DAILY
Qty: 90 CAPSULE | Refills: 1 | Status: SHIPPED | OUTPATIENT
Start: 2025-01-07 | End: 2025-07-06

## 2025-04-23 ENCOUNTER — LAB VISIT (OUTPATIENT)
Dept: LAB | Facility: HOSPITAL | Age: 65
End: 2025-04-23
Attending: NURSE PRACTITIONER
Payer: MEDICAID

## 2025-04-23 DIAGNOSIS — D64.9 ANEMIA, UNSPECIFIED TYPE: ICD-10-CM

## 2025-04-23 DIAGNOSIS — E53.8 BIOTIN-(PROPIONYL-COA-CARBOXYLASE) LIGASE DEFICIENCY: ICD-10-CM

## 2025-04-23 DIAGNOSIS — R73.01 IMPAIRED FASTING GLUCOSE: Primary | ICD-10-CM

## 2025-04-23 DIAGNOSIS — E29.1 3-OXO-5 ALPHA-STEROID DELTA 4-DEHYDROGENASE DEFICIENCY: ICD-10-CM

## 2025-04-23 DIAGNOSIS — E55.9 VITAMIN D DEFICIENCY: ICD-10-CM

## 2025-04-23 LAB
25(OH)D3+25(OH)D2 SERPL-MCNC: 30 NG/ML (ref 30–80)
ALBUMIN SERPL-MCNC: 3.6 G/DL (ref 3.4–4.8)
ALBUMIN/GLOB SERPL: 0.9 RATIO (ref 1.1–2)
ALP SERPL-CCNC: 68 UNIT/L (ref 40–150)
ALT SERPL-CCNC: 19 UNIT/L (ref 0–55)
ANION GAP SERPL CALC-SCNC: 7 MEQ/L
AST SERPL-CCNC: 18 UNIT/L (ref 11–45)
BASOPHILS # BLD AUTO: 0.08 X10(3)/MCL
BASOPHILS NFR BLD AUTO: 0.9 %
BILIRUB SERPL-MCNC: 0.5 MG/DL
BUN SERPL-MCNC: 13.1 MG/DL (ref 8.4–25.7)
CALCIUM SERPL-MCNC: 9.2 MG/DL (ref 8.8–10)
CHLORIDE SERPL-SCNC: 104 MMOL/L (ref 98–107)
CHOLEST SERPL-MCNC: 244 MG/DL
CHOLEST/HDLC SERPL: 5 {RATIO} (ref 0–5)
CO2 SERPL-SCNC: 27 MMOL/L (ref 23–31)
CREAT SERPL-MCNC: 0.89 MG/DL (ref 0.72–1.25)
CREAT/UREA NIT SERPL: 15
CRP SERPL HS-MCNC: 14.3 MG/L
EOSINOPHIL # BLD AUTO: 0.8 X10(3)/MCL (ref 0–0.9)
EOSINOPHIL NFR BLD AUTO: 8.5 %
ERYTHROCYTE [DISTWIDTH] IN BLOOD BY AUTOMATED COUNT: 15.1 % (ref 11.5–17)
EST. AVERAGE GLUCOSE BLD GHB EST-MCNC: 116.9 MG/DL
ESTRADIOL SERPL HS-MCNC: 27 PG/ML
FERRITIN SERPL-MCNC: 55.48 NG/ML (ref 21.81–274.66)
FSH SERPL-ACNC: 5.48 MIU/ML
GFR SERPLBLD CREATININE-BSD FMLA CKD-EPI: >60 ML/MIN/1.73/M2
GLOBULIN SER-MCNC: 3.8 GM/DL (ref 2.4–3.5)
GLUCOSE SERPL-MCNC: 109 MG/DL (ref 82–115)
HBA1C MFR BLD: 5.7 %
HCT VFR BLD AUTO: 45.6 % (ref 42–52)
HDLC SERPL-MCNC: 50 MG/DL (ref 35–60)
HGB BLD-MCNC: 14.7 G/DL (ref 14–18)
IMM GRANULOCYTES # BLD AUTO: 0.09 X10(3)/MCL (ref 0–0.04)
IMM GRANULOCYTES NFR BLD AUTO: 1 %
IRON SATN MFR SERPL: 16 % (ref 20–50)
IRON SERPL-MCNC: 54 UG/DL (ref 65–175)
LDLC SERPL CALC-MCNC: 155 MG/DL (ref 50–140)
LYMPHOCYTES # BLD AUTO: 1.77 X10(3)/MCL (ref 0.6–4.6)
LYMPHOCYTES NFR BLD AUTO: 18.8 %
MAGNESIUM SERPL-MCNC: 1.5 MG/DL (ref 1.6–2.6)
MCH RBC QN AUTO: 29.5 PG (ref 27–31)
MCHC RBC AUTO-ENTMCNC: 32.2 G/DL (ref 33–36)
MCV RBC AUTO: 91.6 FL (ref 80–94)
MONOCYTES # BLD AUTO: 0.96 X10(3)/MCL (ref 0.1–1.3)
MONOCYTES NFR BLD AUTO: 10.2 %
NEUTROPHILS # BLD AUTO: 5.7 X10(3)/MCL (ref 2.1–9.2)
NEUTROPHILS NFR BLD AUTO: 60.6 %
NRBC BLD AUTO-RTO: 0 %
PLATELET # BLD AUTO: 273 X10(3)/MCL (ref 130–400)
PMV BLD AUTO: 9.6 FL (ref 7.4–10.4)
POTASSIUM SERPL-SCNC: 4.7 MMOL/L (ref 3.5–5.1)
PROLACTIN LEVEL (OLG): 4.87 NG/ML (ref 3.46–19.4)
PROT SERPL-MCNC: 7.4 GM/DL (ref 5.8–7.6)
RBC # BLD AUTO: 4.98 X10(6)/MCL (ref 4.7–6.1)
SODIUM SERPL-SCNC: 138 MMOL/L (ref 136–145)
TIBC SERPL-MCNC: 287 UG/DL (ref 60–240)
TIBC SERPL-MCNC: 341 UG/DL (ref 250–450)
TRANSFERRIN SERPL-MCNC: 311 MG/DL (ref 163–344)
TRIGL SERPL-MCNC: 197 MG/DL (ref 34–140)
VIT B12 SERPL-MCNC: 378 PG/ML (ref 213–816)
VLDLC SERPL CALC-MCNC: 39 MG/DL
WBC # BLD AUTO: 9.4 X10(3)/MCL (ref 4.5–11.5)

## 2025-04-23 PROCEDURE — 83036 HEMOGLOBIN GLYCOSYLATED A1C: CPT

## 2025-04-23 PROCEDURE — 80061 LIPID PANEL: CPT

## 2025-04-23 PROCEDURE — 83001 ASSAY OF GONADOTROPIN (FSH): CPT

## 2025-04-23 PROCEDURE — 83735 ASSAY OF MAGNESIUM: CPT

## 2025-04-23 PROCEDURE — 83550 IRON BINDING TEST: CPT

## 2025-04-23 PROCEDURE — 82728 ASSAY OF FERRITIN: CPT

## 2025-04-23 PROCEDURE — 85025 COMPLETE CBC W/AUTO DIFF WBC: CPT

## 2025-04-23 PROCEDURE — 36415 COLL VENOUS BLD VENIPUNCTURE: CPT

## 2025-04-23 PROCEDURE — 80053 COMPREHEN METABOLIC PANEL: CPT

## 2025-04-23 PROCEDURE — 82306 VITAMIN D 25 HYDROXY: CPT

## 2025-04-23 PROCEDURE — 84402 ASSAY OF FREE TESTOSTERONE: CPT

## 2025-04-23 PROCEDURE — 82607 VITAMIN B-12: CPT

## 2025-04-23 PROCEDURE — 84146 ASSAY OF PROLACTIN: CPT

## 2025-04-23 PROCEDURE — 82670 ASSAY OF TOTAL ESTRADIOL: CPT

## 2025-04-23 PROCEDURE — 86141 C-REACTIVE PROTEIN HS: CPT

## 2025-04-30 LAB
TESTOST FREE SERPL-MCNC: 2.97 NG/DL (ref 3.67–13.9)
TESTOST SERPL-MCNC: 96 NG/DL (ref 240–950)

## 2025-07-29 ENCOUNTER — LAB VISIT (OUTPATIENT)
Dept: LAB | Facility: HOSPITAL | Age: 65
End: 2025-07-29
Attending: NURSE PRACTITIONER
Payer: MEDICAID

## 2025-07-29 DIAGNOSIS — E29.1 3-OXO-5 ALPHA-STEROID DELTA 4-DEHYDROGENASE DEFICIENCY: ICD-10-CM

## 2025-07-29 DIAGNOSIS — Z12.5 SPECIAL SCREENING FOR MALIGNANT NEOPLASM OF PROSTATE: ICD-10-CM

## 2025-07-29 DIAGNOSIS — D64.9 ANEMIA, UNSPECIFIED TYPE: Primary | ICD-10-CM

## 2025-07-29 LAB
ESTRADIOL SERPL HS-MCNC: 27 PG/ML
FREE/TOTAL PSA (OLG): 0.2
PSA FREE MFR SERPL: 17.19 %
PSA FREE SERPL-MCNC: 0.76 NG/ML
PSA SERPL-MCNC: 4.42 NG/ML

## 2025-07-29 PROCEDURE — 36415 COLL VENOUS BLD VENIPUNCTURE: CPT

## 2025-07-29 PROCEDURE — 84403 ASSAY OF TOTAL TESTOSTERONE: CPT

## 2025-07-29 PROCEDURE — 84153 ASSAY OF PSA TOTAL: CPT

## 2025-07-29 PROCEDURE — 82670 ASSAY OF TOTAL ESTRADIOL: CPT
